# Patient Record
Sex: MALE | Race: WHITE | Employment: OTHER | URBAN - METROPOLITAN AREA
[De-identification: names, ages, dates, MRNs, and addresses within clinical notes are randomized per-mention and may not be internally consistent; named-entity substitution may affect disease eponyms.]

---

## 2021-12-13 ENCOUNTER — APPOINTMENT (OUTPATIENT)
Dept: ULTRASOUND IMAGING | Age: 56
DRG: 241 | End: 2021-12-13
Payer: MEDICAID

## 2021-12-13 ENCOUNTER — ANESTHESIA EVENT (OUTPATIENT)
Dept: OPERATING ROOM | Age: 56
DRG: 241 | End: 2021-12-13
Payer: MEDICAID

## 2021-12-13 ENCOUNTER — APPOINTMENT (OUTPATIENT)
Dept: CT IMAGING | Age: 56
DRG: 241 | End: 2021-12-13
Payer: MEDICAID

## 2021-12-13 ENCOUNTER — APPOINTMENT (OUTPATIENT)
Dept: GENERAL RADIOLOGY | Age: 56
DRG: 241 | End: 2021-12-13
Payer: MEDICAID

## 2021-12-13 ENCOUNTER — HOSPITAL ENCOUNTER (INPATIENT)
Age: 56
LOS: 4 days | Discharge: HOME | DRG: 241 | End: 2021-12-17
Attending: EMERGENCY MEDICINE | Admitting: STUDENT IN AN ORGANIZED HEALTH CARE EDUCATION/TRAINING PROGRAM
Payer: MEDICAID

## 2021-12-13 DIAGNOSIS — D50.0 ANEMIA, BLOOD LOSS: ICD-10-CM

## 2021-12-13 DIAGNOSIS — M25.531 ACUTE PAIN OF RIGHT WRIST: ICD-10-CM

## 2021-12-13 DIAGNOSIS — I48.91 ATRIAL FIBRILLATION WITH RVR (HCC): ICD-10-CM

## 2021-12-13 DIAGNOSIS — R41.82 ALTERED MENTAL STATUS, UNSPECIFIED ALTERED MENTAL STATUS TYPE: ICD-10-CM

## 2021-12-13 DIAGNOSIS — K92.1 GASTROINTESTINAL HEMORRHAGE WITH MELENA: Primary | ICD-10-CM

## 2021-12-13 PROBLEM — B35.6 TINEA CRURIS: Status: ACTIVE | Noted: 2021-12-13

## 2021-12-13 PROBLEM — E66.01 MORBID OBESITY (HCC): Status: ACTIVE | Noted: 2021-12-13

## 2021-12-13 PROBLEM — R06.02 SHORTNESS OF BREATH: Status: ACTIVE | Noted: 2021-12-13

## 2021-12-13 PROBLEM — E78.5 HYPERLIPIDEMIA: Status: ACTIVE | Noted: 2021-12-13

## 2021-12-13 PROBLEM — I10 HYPERTENSION: Status: ACTIVE | Noted: 2021-12-13

## 2021-12-13 PROBLEM — M06.9 RHEUMATOID ARTHRITIS (HCC): Status: ACTIVE | Noted: 2021-12-13

## 2021-12-13 PROBLEM — E78.5 HYPERLIPIDEMIA: Status: RESOLVED | Noted: 2021-12-13 | Resolved: 2021-12-13

## 2021-12-13 PROBLEM — K92.2 GI BLEED: Status: ACTIVE | Noted: 2021-12-13

## 2021-12-13 LAB
-: NORMAL
ABSOLUTE EOS #: 0.1 K/UL (ref 0–0.4)
ABSOLUTE IMMATURE GRANULOCYTE: ABNORMAL K/UL (ref 0–0.3)
ABSOLUTE LYMPH #: 3.7 K/UL (ref 1–4.8)
ABSOLUTE MONO #: 1.3 K/UL (ref 0.1–1.2)
ACETAMINOPHEN LEVEL: <5 UG/ML (ref 10–30)
ALBUMIN SERPL-MCNC: 3.2 G/DL (ref 3.5–5.2)
ALBUMIN/GLOBULIN RATIO: 1.1 (ref 1–2.5)
ALLEN TEST: ABNORMAL
ALP BLD-CCNC: 74 U/L (ref 40–129)
ALT SERPL-CCNC: 11 U/L (ref 5–41)
AMMONIA: 46 UMOL/L (ref 16–60)
AMPHETAMINE SCREEN URINE: NEGATIVE
ANION GAP SERPL CALCULATED.3IONS-SCNC: 16 MMOL/L (ref 9–17)
AST SERPL-CCNC: 10 U/L
BARBITURATE SCREEN URINE: NEGATIVE
BASOPHILS # BLD: 1 % (ref 0–2)
BASOPHILS ABSOLUTE: 0.3 K/UL (ref 0–0.2)
BENZODIAZEPINE SCREEN, URINE: NEGATIVE
BILIRUB SERPL-MCNC: 0.19 MG/DL (ref 0.3–1.2)
BILIRUBIN URINE: NEGATIVE
BNP INTERPRETATION: ABNORMAL
BUN BLDV-MCNC: 44 MG/DL (ref 6–20)
BUN/CREAT BLD: ABNORMAL (ref 9–20)
BUPRENORPHINE URINE: ABNORMAL
CALCIUM SERPL-MCNC: 9 MG/DL (ref 8.6–10.4)
CANNABINOID SCREEN URINE: NEGATIVE
CHLORIDE BLD-SCNC: 103 MMOL/L (ref 98–107)
CO2: 22 MMOL/L (ref 20–31)
COCAINE METABOLITE, URINE: NEGATIVE
COLOR: YELLOW
COMMENT UA: NORMAL
CREAT SERPL-MCNC: 1.19 MG/DL (ref 0.7–1.2)
D-DIMER QUANTITATIVE: 1.33 MG/L FEU
DIFFERENTIAL TYPE: ABNORMAL
DIRECT EXAM: NORMAL
EOSINOPHILS RELATIVE PERCENT: 0 % (ref 1–4)
ETHANOL PERCENT: <0.01 %
ETHANOL: <10 MG/DL
FIO2: ABNORMAL
GFR AFRICAN AMERICAN: >60 ML/MIN
GFR NON-AFRICAN AMERICAN: >60 ML/MIN
GFR SERPL CREATININE-BSD FRML MDRD: ABNORMAL ML/MIN/{1.73_M2}
GFR SERPL CREATININE-BSD FRML MDRD: ABNORMAL ML/MIN/{1.73_M2}
GLUCOSE BLD-MCNC: 162 MG/DL (ref 70–99)
GLUCOSE URINE: NEGATIVE
HCO3 VENOUS: 24.1 MMOL/L (ref 22–29)
HCT VFR BLD CALC: 19.6 % (ref 41–53)
HCT VFR BLD CALC: 24 % (ref 41–53)
HEMOGLOBIN: 6.2 G/DL (ref 13.5–17.5)
HEMOGLOBIN: 7.7 G/DL (ref 13.5–17.5)
IMMATURE GRANULOCYTES: ABNORMAL %
INR BLD: 1.3
KETONES, URINE: NEGATIVE
LACTIC ACID, SEPSIS WHOLE BLOOD: NORMAL MMOL/L (ref 0.5–1.9)
LACTIC ACID, SEPSIS: 1.8 MMOL/L (ref 0.5–1.9)
LACTIC ACID: 2.9 MMOL/L (ref 0.5–2.2)
LEUKOCYTE ESTERASE, URINE: NEGATIVE
LV EF: 48 %
LVEF MODALITY: NORMAL
LYMPHOCYTES # BLD: 17 % (ref 24–44)
Lab: NORMAL
MCH RBC QN AUTO: 25.3 PG (ref 26–34)
MCHC RBC AUTO-ENTMCNC: 32 G/DL (ref 31–37)
MCV RBC AUTO: 79.3 FL (ref 80–100)
MDMA URINE: ABNORMAL
METHADONE SCREEN, URINE: NEGATIVE
METHAMPHETAMINE, URINE: ABNORMAL
MODE: ABNORMAL
MONOCYTES # BLD: 6 % (ref 2–11)
NEGATIVE BASE EXCESS, VEN: ABNORMAL (ref 0–2)
NITRITE, URINE: NEGATIVE
NRBC AUTOMATED: ABNORMAL PER 100 WBC
O2 DEVICE/FLOW/%: ABNORMAL
O2 SAT, VEN: 58 % (ref 60–85)
OPIATES, URINE: POSITIVE
OXYCODONE SCREEN URINE: NEGATIVE
PARTIAL THROMBOPLASTIN TIME: 30.7 SEC (ref 21.3–31.3)
PATIENT TEMP: ABNORMAL
PCO2, VEN: 38.2 MM HG (ref 41–51)
PDW BLD-RTO: 17.7 % (ref 12.5–15.4)
PH UA: 5.5 (ref 5–8)
PH VENOUS: 7.41 (ref 7.32–7.43)
PHENCYCLIDINE, URINE: NEGATIVE
PLATELET # BLD: 638 K/UL (ref 140–450)
PLATELET ESTIMATE: ABNORMAL
PMV BLD AUTO: 8.4 FL (ref 6–12)
PO2, VEN: 29.9 MM HG (ref 30–50)
POC OCCULT BLOOD, FECAL: POSITIVE
POC PCO2 TEMP: ABNORMAL MM HG
POC PH TEMP: ABNORMAL
POC PO2 TEMP: ABNORMAL MM HG
POSITIVE BASE EXCESS, VEN: 0 (ref 0–3)
POTASSIUM SERPL-SCNC: 4.6 MMOL/L (ref 3.7–5.3)
PRO-BNP: 1763 PG/ML
PROPOXYPHENE, URINE: ABNORMAL
PROTEIN UA: NEGATIVE
PROTHROMBIN TIME: 13.6 SEC (ref 9.4–12.6)
RBC # BLD: 3.03 M/UL (ref 4.5–5.9)
RBC # BLD: ABNORMAL 10*6/UL
REASON FOR REJECTION: NORMAL
SALICYLATE LEVEL: <1 MG/DL (ref 3–10)
SAMPLE SITE: ABNORMAL
SARS-COV-2, RAPID: NOT DETECTED
SEG NEUTROPHILS: 76 % (ref 36–66)
SEGMENTED NEUTROPHILS ABSOLUTE COUNT: 16.2 K/UL (ref 1.8–7.7)
SODIUM BLD-SCNC: 141 MMOL/L (ref 135–144)
SPECIFIC GRAVITY UA: 1.02 (ref 1–1.03)
SPECIMEN DESCRIPTION: NORMAL
SPECIMEN DESCRIPTION: NORMAL
TEST INFORMATION: ABNORMAL
TOTAL CO2, VENOUS: ABNORMAL MMOL/L (ref 23–30)
TOTAL PROTEIN: 6.2 G/DL (ref 6.4–8.3)
TOXIC TRICYCLIC SC,BLOOD: NEGATIVE
TRICYCLIC ANTIDEPRESSANTS, UR: ABNORMAL
TROPONIN INTERP: NORMAL
TROPONIN T: NORMAL NG/ML
TROPONIN, HIGH SENSITIVITY: 11 NG/L (ref 0–22)
TSH SERPL DL<=0.05 MIU/L-ACNC: 4.84 MIU/L (ref 0.3–5)
TURBIDITY: CLEAR
URINE HGB: NEGATIVE
UROBILINOGEN, URINE: NORMAL
WBC # BLD: 21.5 K/UL (ref 3.5–11)
WBC # BLD: ABNORMAL 10*3/UL
ZZ NTE CLEAN UP: ORDERED TEST: NORMAL
ZZ NTE WITH NAME CLEAN UP: SPECIMEN SOURCE: NORMAL

## 2021-12-13 PROCEDURE — 80053 COMPREHEN METABOLIC PANEL: CPT

## 2021-12-13 PROCEDURE — 85014 HEMATOCRIT: CPT

## 2021-12-13 PROCEDURE — 96375 TX/PRO/DX INJ NEW DRUG ADDON: CPT

## 2021-12-13 PROCEDURE — 36415 COLL VENOUS BLD VENIPUNCTURE: CPT

## 2021-12-13 PROCEDURE — 83605 ASSAY OF LACTIC ACID: CPT

## 2021-12-13 PROCEDURE — 94760 N-INVAS EAR/PLS OXIMETRY 1: CPT

## 2021-12-13 PROCEDURE — 93306 TTE W/DOPPLER COMPLETE: CPT

## 2021-12-13 PROCEDURE — 85379 FIBRIN DEGRADATION QUANT: CPT

## 2021-12-13 PROCEDURE — 71260 CT THORAX DX C+: CPT

## 2021-12-13 PROCEDURE — 87635 SARS-COV-2 COVID-19 AMP PRB: CPT

## 2021-12-13 PROCEDURE — 80179 DRUG ASSAY SALICYLATE: CPT

## 2021-12-13 PROCEDURE — 6360000004 HC RX CONTRAST MEDICATION: Performed by: EMERGENCY MEDICINE

## 2021-12-13 PROCEDURE — 85610 PROTHROMBIN TIME: CPT

## 2021-12-13 PROCEDURE — 6360000004 HC RX CONTRAST MEDICATION: Performed by: INTERNAL MEDICINE

## 2021-12-13 PROCEDURE — 86901 BLOOD TYPING SEROLOGIC RH(D): CPT

## 2021-12-13 PROCEDURE — 86850 RBC ANTIBODY SCREEN: CPT

## 2021-12-13 PROCEDURE — 93005 ELECTROCARDIOGRAM TRACING: CPT | Performed by: EMERGENCY MEDICINE

## 2021-12-13 PROCEDURE — 84443 ASSAY THYROID STIM HORMONE: CPT

## 2021-12-13 PROCEDURE — 93971 EXTREMITY STUDY: CPT

## 2021-12-13 PROCEDURE — 70450 CT HEAD/BRAIN W/O DYE: CPT

## 2021-12-13 PROCEDURE — 6360000002 HC RX W HCPCS: Performed by: EMERGENCY MEDICINE

## 2021-12-13 PROCEDURE — 86920 COMPATIBILITY TEST SPIN: CPT

## 2021-12-13 PROCEDURE — G0480 DRUG TEST DEF 1-7 CLASSES: HCPCS

## 2021-12-13 PROCEDURE — 2580000003 HC RX 258: Performed by: EMERGENCY MEDICINE

## 2021-12-13 PROCEDURE — 80143 DRUG ASSAY ACETAMINOPHEN: CPT

## 2021-12-13 PROCEDURE — 80307 DRUG TEST PRSMV CHEM ANLYZR: CPT

## 2021-12-13 PROCEDURE — 82272 OCCULT BLD FECES 1-3 TESTS: CPT

## 2021-12-13 PROCEDURE — P9040 RBC LEUKOREDUCED IRRADIATED: HCPCS

## 2021-12-13 PROCEDURE — 74177 CT ABD & PELVIS W/CONTRAST: CPT

## 2021-12-13 PROCEDURE — 82140 ASSAY OF AMMONIA: CPT

## 2021-12-13 PROCEDURE — P9016 RBC LEUKOCYTES REDUCED: HCPCS

## 2021-12-13 PROCEDURE — 99285 EMERGENCY DEPT VISIT HI MDM: CPT

## 2021-12-13 PROCEDURE — 2500000003 HC RX 250 WO HCPCS: Performed by: EMERGENCY MEDICINE

## 2021-12-13 PROCEDURE — 99254 IP/OBS CNSLTJ NEW/EST MOD 60: CPT | Performed by: INTERNAL MEDICINE

## 2021-12-13 PROCEDURE — 71045 X-RAY EXAM CHEST 1 VIEW: CPT

## 2021-12-13 PROCEDURE — 82803 BLOOD GASES ANY COMBINATION: CPT

## 2021-12-13 PROCEDURE — 83880 ASSAY OF NATRIURETIC PEPTIDE: CPT

## 2021-12-13 PROCEDURE — 36430 TRANSFUSION BLD/BLD COMPNT: CPT

## 2021-12-13 PROCEDURE — 87804 INFLUENZA ASSAY W/OPTIC: CPT

## 2021-12-13 PROCEDURE — 87040 BLOOD CULTURE FOR BACTERIA: CPT

## 2021-12-13 PROCEDURE — 2580000003 HC RX 258: Performed by: STUDENT IN AN ORGANIZED HEALTH CARE EDUCATION/TRAINING PROGRAM

## 2021-12-13 PROCEDURE — C9113 INJ PANTOPRAZOLE SODIUM, VIA: HCPCS | Performed by: INTERNAL MEDICINE

## 2021-12-13 PROCEDURE — 84484 ASSAY OF TROPONIN QUANT: CPT

## 2021-12-13 PROCEDURE — 81003 URINALYSIS AUTO W/O SCOPE: CPT

## 2021-12-13 PROCEDURE — 87086 URINE CULTURE/COLONY COUNT: CPT

## 2021-12-13 PROCEDURE — 99222 1ST HOSP IP/OBS MODERATE 55: CPT | Performed by: STUDENT IN AN ORGANIZED HEALTH CARE EDUCATION/TRAINING PROGRAM

## 2021-12-13 PROCEDURE — 2060000000 HC ICU INTERMEDIATE R&B

## 2021-12-13 PROCEDURE — 85025 COMPLETE CBC W/AUTO DIFF WBC: CPT

## 2021-12-13 PROCEDURE — 85018 HEMOGLOBIN: CPT

## 2021-12-13 PROCEDURE — 6370000000 HC RX 637 (ALT 250 FOR IP): Performed by: STUDENT IN AN ORGANIZED HEALTH CARE EDUCATION/TRAINING PROGRAM

## 2021-12-13 PROCEDURE — 2500000003 HC RX 250 WO HCPCS: Performed by: STUDENT IN AN ORGANIZED HEALTH CARE EDUCATION/TRAINING PROGRAM

## 2021-12-13 PROCEDURE — 85730 THROMBOPLASTIN TIME PARTIAL: CPT

## 2021-12-13 PROCEDURE — 2580000003 HC RX 258: Performed by: INTERNAL MEDICINE

## 2021-12-13 PROCEDURE — 73110 X-RAY EXAM OF WRIST: CPT

## 2021-12-13 PROCEDURE — 6360000002 HC RX W HCPCS: Performed by: INTERNAL MEDICINE

## 2021-12-13 PROCEDURE — 96374 THER/PROPH/DIAG INJ IV PUSH: CPT

## 2021-12-13 PROCEDURE — C9113 INJ PANTOPRAZOLE SODIUM, VIA: HCPCS | Performed by: EMERGENCY MEDICINE

## 2021-12-13 PROCEDURE — 86900 BLOOD TYPING SEROLOGIC ABO: CPT

## 2021-12-13 RX ORDER — PANTOPRAZOLE SODIUM 40 MG/10ML
40 INJECTION, POWDER, LYOPHILIZED, FOR SOLUTION INTRAVENOUS ONCE
Status: COMPLETED | OUTPATIENT
Start: 2021-12-13 | End: 2021-12-13

## 2021-12-13 RX ORDER — MORPHINE SULFATE 4 MG/ML
4 INJECTION, SOLUTION INTRAMUSCULAR; INTRAVENOUS ONCE
Status: DISCONTINUED | OUTPATIENT
Start: 2021-12-13 | End: 2021-12-13

## 2021-12-13 RX ORDER — SODIUM CHLORIDE 0.9 % (FLUSH) 0.9 %
5-40 SYRINGE (ML) INJECTION EVERY 12 HOURS SCHEDULED
Status: DISCONTINUED | OUTPATIENT
Start: 2021-12-13 | End: 2021-12-17 | Stop reason: HOSPADM

## 2021-12-13 RX ORDER — 0.9 % SODIUM CHLORIDE 0.9 %
80 INTRAVENOUS SOLUTION INTRAVENOUS ONCE
Status: COMPLETED | OUTPATIENT
Start: 2021-12-13 | End: 2021-12-13

## 2021-12-13 RX ORDER — ACETAMINOPHEN 325 MG/1
650 TABLET ORAL EVERY 6 HOURS PRN
Status: DISCONTINUED | OUTPATIENT
Start: 2021-12-13 | End: 2021-12-17 | Stop reason: HOSPADM

## 2021-12-13 RX ORDER — SODIUM CHLORIDE 0.9 % (FLUSH) 0.9 %
10 SYRINGE (ML) INJECTION PRN
Status: DISCONTINUED | OUTPATIENT
Start: 2021-12-13 | End: 2021-12-17 | Stop reason: HOSPADM

## 2021-12-13 RX ORDER — KETOCONAZOLE 20 MG/G
CREAM TOPICAL DAILY
Status: DISCONTINUED | OUTPATIENT
Start: 2021-12-14 | End: 2021-12-17 | Stop reason: HOSPADM

## 2021-12-13 RX ORDER — SODIUM CHLORIDE 0.9 % (FLUSH) 0.9 %
5-40 SYRINGE (ML) INJECTION PRN
Status: DISCONTINUED | OUTPATIENT
Start: 2021-12-13 | End: 2021-12-17 | Stop reason: HOSPADM

## 2021-12-13 RX ORDER — ACETAMINOPHEN 650 MG/1
650 SUPPOSITORY RECTAL EVERY 6 HOURS PRN
Status: DISCONTINUED | OUTPATIENT
Start: 2021-12-13 | End: 2021-12-17 | Stop reason: HOSPADM

## 2021-12-13 RX ORDER — SODIUM CHLORIDE 9 MG/ML
10 INJECTION INTRAVENOUS ONCE
Status: COMPLETED | OUTPATIENT
Start: 2021-12-13 | End: 2021-12-13

## 2021-12-13 RX ORDER — MORPHINE SULFATE 4 MG/ML
4 INJECTION, SOLUTION INTRAMUSCULAR; INTRAVENOUS ONCE
Status: COMPLETED | OUTPATIENT
Start: 2021-12-13 | End: 2021-12-13

## 2021-12-13 RX ORDER — ONDANSETRON 4 MG/1
4 TABLET, ORALLY DISINTEGRATING ORAL EVERY 8 HOURS PRN
Status: DISCONTINUED | OUTPATIENT
Start: 2021-12-13 | End: 2021-12-17 | Stop reason: HOSPADM

## 2021-12-13 RX ORDER — DILTIAZEM HYDROCHLORIDE 5 MG/ML
20 INJECTION INTRAVENOUS ONCE
Status: DISCONTINUED | OUTPATIENT
Start: 2021-12-13 | End: 2021-12-13

## 2021-12-13 RX ORDER — SODIUM CHLORIDE 9 MG/ML
INJECTION, SOLUTION INTRAVENOUS PRN
Status: DISCONTINUED | OUTPATIENT
Start: 2021-12-13 | End: 2021-12-17 | Stop reason: HOSPADM

## 2021-12-13 RX ORDER — METOPROLOL SUCCINATE 50 MG/1
50 TABLET, EXTENDED RELEASE ORAL 2 TIMES DAILY
Status: ON HOLD | COMMUNITY
End: 2021-12-16 | Stop reason: HOSPADM

## 2021-12-13 RX ORDER — MELOXICAM 7.5 MG/1
7.5 TABLET ORAL PRN
Status: ON HOLD | COMMUNITY
End: 2021-12-16 | Stop reason: HOSPADM

## 2021-12-13 RX ORDER — GABAPENTIN 100 MG/1
100 CAPSULE ORAL 2 TIMES DAILY
Status: ON HOLD | COMMUNITY
End: 2021-12-16 | Stop reason: HOSPADM

## 2021-12-13 RX ORDER — IBUPROFEN 200 MG
200 TABLET ORAL EVERY 6 HOURS PRN
Status: ON HOLD | COMMUNITY
End: 2021-12-16 | Stop reason: HOSPADM

## 2021-12-13 RX ORDER — 0.9 % SODIUM CHLORIDE 0.9 %
1000 INTRAVENOUS SOLUTION INTRAVENOUS ONCE
Status: COMPLETED | OUTPATIENT
Start: 2021-12-13 | End: 2021-12-13

## 2021-12-13 RX ORDER — SODIUM CHLORIDE 9 MG/ML
25 INJECTION, SOLUTION INTRAVENOUS PRN
Status: DISCONTINUED | OUTPATIENT
Start: 2021-12-13 | End: 2021-12-17 | Stop reason: HOSPADM

## 2021-12-13 RX ORDER — AMLODIPINE BESYLATE 5 MG/1
5 TABLET ORAL DAILY
Status: ON HOLD | COMMUNITY
End: 2021-12-16 | Stop reason: HOSPADM

## 2021-12-13 RX ORDER — ONDANSETRON 2 MG/ML
4 INJECTION INTRAMUSCULAR; INTRAVENOUS EVERY 6 HOURS PRN
Status: DISCONTINUED | OUTPATIENT
Start: 2021-12-13 | End: 2021-12-17 | Stop reason: HOSPADM

## 2021-12-13 RX ADMIN — ACETAMINOPHEN 650 MG: 325 TABLET ORAL at 20:10

## 2021-12-13 RX ADMIN — SODIUM CHLORIDE 8 MG/HR: 9 INJECTION, SOLUTION INTRAVENOUS at 20:25

## 2021-12-13 RX ADMIN — SODIUM CHLORIDE 1000 ML: 9 INJECTION, SOLUTION INTRAVENOUS at 07:28

## 2021-12-13 RX ADMIN — SODIUM CHLORIDE 10 ML: 9 INJECTION, SOLUTION INTRAMUSCULAR; INTRAVENOUS; SUBCUTANEOUS at 08:27

## 2021-12-13 RX ADMIN — DILTIAZEM HYDROCHLORIDE 5 MG/HR: 5 INJECTION INTRAVENOUS at 09:05

## 2021-12-13 RX ADMIN — SODIUM CHLORIDE 80 ML: 9 INJECTION, SOLUTION INTRAVENOUS at 08:42

## 2021-12-13 RX ADMIN — SODIUM CHLORIDE 80 ML: 9 INJECTION, SOLUTION INTRAVENOUS at 14:55

## 2021-12-13 RX ADMIN — SODIUM CHLORIDE, PRESERVATIVE FREE 10 ML: 5 INJECTION INTRAVENOUS at 14:55

## 2021-12-13 RX ADMIN — SODIUM CHLORIDE, PRESERVATIVE FREE 10 ML: 5 INJECTION INTRAVENOUS at 20:10

## 2021-12-13 RX ADMIN — IOPAMIDOL 75 ML: 755 INJECTION, SOLUTION INTRAVENOUS at 08:42

## 2021-12-13 RX ADMIN — DILTIAZEM HYDROCHLORIDE 5 MG/HR: 5 INJECTION INTRAVENOUS at 23:06

## 2021-12-13 RX ADMIN — PANTOPRAZOLE SODIUM 40 MG: 40 INJECTION, POWDER, FOR SOLUTION INTRAVENOUS at 08:27

## 2021-12-13 RX ADMIN — DILTIAZEM HYDROCHLORIDE 5 MG/HR: 5 INJECTION INTRAVENOUS at 16:59

## 2021-12-13 RX ADMIN — SODIUM CHLORIDE, PRESERVATIVE FREE 10 ML: 5 INJECTION INTRAVENOUS at 08:43

## 2021-12-13 RX ADMIN — IOPAMIDOL 75 ML: 755 INJECTION, SOLUTION INTRAVENOUS at 14:59

## 2021-12-13 RX ADMIN — MORPHINE SULFATE 4 MG: 4 INJECTION INTRAVENOUS at 08:24

## 2021-12-13 ASSESSMENT — PAIN DESCRIPTION - ORIENTATION
ORIENTATION: RIGHT

## 2021-12-13 ASSESSMENT — PAIN DESCRIPTION - PROGRESSION
CLINICAL_PROGRESSION: GRADUALLY IMPROVING

## 2021-12-13 ASSESSMENT — PAIN DESCRIPTION - PAIN TYPE
TYPE: ACUTE PAIN

## 2021-12-13 ASSESSMENT — PAIN SCALES - GENERAL
PAINLEVEL_OUTOF10: 5
PAINLEVEL_OUTOF10: 10
PAINLEVEL_OUTOF10: 7
PAINLEVEL_OUTOF10: 3
PAINLEVEL_OUTOF10: 0
PAINLEVEL_OUTOF10: 10

## 2021-12-13 ASSESSMENT — PAIN DESCRIPTION - LOCATION
LOCATION: WRIST;ABDOMEN
LOCATION: WRIST

## 2021-12-13 ASSESSMENT — PAIN DESCRIPTION - DESCRIPTORS
DESCRIPTORS: STABBING
DESCRIPTORS: ACHING
DESCRIPTORS: STABBING;THROBBING

## 2021-12-13 NOTE — H&P
patient states that his symptoms began several days ago and have progressively worsened. He is a  who receives most of his medical care in [place]. In the ED, the patient was mildly hypotensive and ill-appearing. He was found to have an acute blood loss anemia with a hemoglobin of 7.7. Fecal occult blood was positive. He is admitted to internal medicine for further management of acute blood loss anemia secondary to GI bleed. Past Medical History:     Past Medical History:   Diagnosis Date    Arthritis     Juvenile rheumatoid arthritis    Atrial fibrillation ([address]     Hyperlipidemia 12/13/2021    Hypertension         Past Surgical History:     Past Surgical History:   Procedure Laterality Date    ACHILLES TENDON SURGERY      KNEE SURGERY          Medications Prior to Admission:     Prior to Admission medications    Medication Sig Start Date End Date Taking? Authorizing Provider   amLODIPine (NORVASC) 5 MG tablet Take 5 mg by mouth daily   Yes Historical Provider, MD   Loperamide-Simethicone (IMODIUM ADVANCED PO) Take by mouth   Yes Historical Provider, MD   gabapentin (NEURONTIN) 100 MG capsule Take 100 mg by mouth 2 times daily. Yes Historical Provider, MD   metoprolol succinate (TOPROL XL) 50 MG extended release tablet Take 50 mg by mouth 2 times daily   Yes Historical Provider, MD   meloxicam (MOBIC) 7.5 MG tablet Take 7.5 mg by mouth as needed for Pain   Yes Historical Provider, MD   methotrexate (RHEUMATREX) 2.5 MG chemo tablet Take 7.5 mg by mouth once a week   Yes Historical Provider, MD   rivaroxaban (XARELTO) 20 MG TABS tablet Take 20 mg by mouth Daily with supper   Yes Historical Provider, MD   ibuprofen (ADVIL;MOTRIN) 200 MG tablet Take 200 mg by mouth every 6 hours as needed for Pain   Yes Historical Provider, MD        Allergies:     Patient has no known allergies. Social History:     Tobacco:    reports that he has never smoked.  He has never used smokeless tobacco.  Alcohol: reports current alcohol use. Drug Use:  reports no history of drug use. Family History:     History reviewed. No pertinent family history. Review of Systems:     Positive and Negative as described in HPI. CONSTITUTIONAL:  Positive for fatigue. HEENT:  negative for vision, hearing changes, runny nose, throat pain  RESPIRATORY:  negative for shortness of breath, cough, congestion, wheezing  CARDIOVASCULAR:  negative for chest pain, palpitations  GASTROINTESTINAL:  Positive for dark, tarry stools. GENITOURINARY:  negative for difficulty of urination, burning with urination, frequency   INTEGUMENT:  Positive for rash in groin. HEMATOLOGIC/LYMPHATIC:  negative for swelling/edema   ALLERGIC/IMMUNOLOGIC:  negative for urticaria , itching  ENDOCRINE:  negative increase in drinking, increase in urination, hot or cold intolerance  MUSCULOSKELETAL:  negative joint pains, muscle aches, swelling of joints  NEUROLOGICAL:  negative for headaches, dizziness, lightheadedness, numbness, pain, tingling extremities  BEHAVIOR/PSYCH:  negative for depression, anxiety    Physical Exam:   BP 94/69   Pulse 130   Temp 97.7 °F (36.5 °C) (Oral)   Resp 22   Ht 6' 5\" (1.956 m)   Wt (!) 330 lb (149.7 kg)   SpO2 99%   BMI 39.13 kg/m²   Temp (24hrs), Av.7 °F (36.5 °C), Min:97.7 °F (36.5 °C), Max:97.7 °F (36.5 °C)    No results for input(s): POCGLU in the last 72 hours. Intake/Output Summary (Last 24 hours) at 2021 1113  Last data filed at 2021 0942  Gross per 24 hour   Intake 1080 ml   Output --   Net 1080 ml       General Appearance:  Ill-appearing.    Mental status: oriented to person, place, and time  Head:  normocephalic, atraumatic  Eye: no icterus, redness, pupils equal and reactive, extraocular eye movements intact, conjunctiva clear  Ear: normal external ear, no discharge, hearing intact  Nose:  no drainage noted  Mouth: mucous membranes moist  Neck: supple, no carotid bruits, thyroid not palpable  Lungs: Decreased breath sounds bilaterally.    Cardiovascular: normal rate, regular rhythm, no murmur, gallop, rub  Abdomen: Soft, nontender, nondistended, normal bowel sounds, no hepatomegaly or splenomegaly  Neurologic: There are no new focal motor or sensory deficits, normal muscle tone and bulk, no abnormal sensation, normal speech, cranial nerves II through XII grossly intact  Skin: No gross lesions, rashes, bruising or bleeding on exposed skin area  Extremities:  Chronic venous stasis wounds present on bilateral lower extremities   Psych: normal affect     Investigations:      Laboratory Testing:  Recent Results (from the past 24 hour(s))   CBC Auto Differential    Collection Time: 12/13/21  5:55 AM   Result Value Ref Range    WBC 21.5 (H) 3.5 - 11.0 k/uL    RBC 3.03 (L) 4.5 - 5.9 m/uL    Hemoglobin 7.7 (L) 13.5 - 17.5 g/dL    Hematocrit 24.0 (L) 41 - 53 %    MCV 79.3 (L) 80 - 100 fL    MCH 25.3 (L) 26 - 34 pg    MCHC 32.0 31 - 37 g/dL    RDW 17.7 (H) 12.5 - 15.4 %    Platelets 391 (H) 176 - 450 k/uL    MPV 8.4 6.0 - 12.0 fL    NRBC Automated NOT REPORTED per 100 WBC    Differential Type NOT REPORTED     Immature Granulocytes NOT REPORTED 0 %    Absolute Immature Granulocyte NOT REPORTED 0.00 - 0.30 k/uL    WBC Morphology NOT REPORTED     RBC Morphology NOT REPORTED     Platelet Estimate NOT REPORTED     Seg Neutrophils 76 (H) 36 - 66 %    Lymphocytes 17 (L) 24 - 44 %    Monocytes 6 2 - 11 %    Eosinophils % 0 (L) 1 - 4 %    Basophils 1 0 - 2 %    Segs Absolute 16.20 (H) 1.8 - 7.7 k/uL    Absolute Lymph # 3.70 1.0 - 4.8 k/uL    Absolute Mono # 1.30 (H) 0.1 - 1.2 k/uL    Absolute Eos # 0.10 0.0 - 0.4 k/uL    Basophils Absolute 0.30 (H) 0.0 - 0.2 k/uL   Comprehensive Metabolic Panel w/ Reflex to MG    Collection Time: 12/13/21  5:55 AM   Result Value Ref Range    Glucose 162 (H) 70 - 99 mg/dL    BUN 44 (H) 6 - 20 mg/dL    CREATININE 1.19 0.70 - 1.20 mg/dL    Bun/Cre Ratio NOT REPORTED 9 - 20    Calcium 9.0 8.6 - 10.4 mg/dL    Sodium 141 135 - 144 mmol/L    Potassium 4.6 3.7 - 5.3 mmol/L    Chloride 103 98 - 107 mmol/L    CO2 22 20 - 31 mmol/L    Anion Gap 16 9 - 17 mmol/L    Alkaline Phosphatase 74 40 - 129 U/L    ALT 11 5 - 41 U/L    AST 10 <40 U/L    Total Bilirubin 0.19 (L) 0.3 - 1.2 mg/dL    Total Protein 6.2 (L) 6.4 - 8.3 g/dL    Albumin 3.2 (L) 3.5 - 5.2 g/dL    Albumin/Globulin Ratio 1.1 1.0 - 2.5    GFR Non-African American >60 >60 mL/min    GFR African American >60 >60 mL/min    GFR Comment          GFR Staging NOT REPORTED    Protime-INR    Collection Time: 12/13/21  5:55 AM   Result Value Ref Range    Protime 13.6 (H) 9.4 - 12.6 sec    INR 1.3    APTT    Collection Time: 12/13/21  5:55 AM   Result Value Ref Range    PTT 30.7 21.3 - 31.3 sec   Brain Natriuretic Peptide    Collection Time: 12/13/21  5:55 AM   Result Value Ref Range    Pro-BNP 1,763 (H) <300 pg/mL    BNP Interpretation NOT REPORTED    D-Dimer, Quantitative    Collection Time: 12/13/21  5:55 AM   Result Value Ref Range    D-Dimer, Quant 1.33 mg/L FEU   TOX SCR, BLD, ED    Collection Time: 12/13/21  5:55 AM   Result Value Ref Range    Acetaminophen Level <5 (L) 10 - 30 ug/mL    Ethanol <10 <10 mg/dL    Ethanol percent <0.433 <0.420 %    Salicylate Lvl <1 (L) 3 - 10 mg/dL    Toxic Tricyclic Sc,Blood NEGATIVE NEGATIVE   Lactic Acid    Collection Time: 12/13/21  5:55 AM   Result Value Ref Range    Lactic Acid 2.9 (H) 0.5 - 2.2 mmol/L   Troponin    Collection Time: 12/13/21  5:55 AM   Result Value Ref Range    Troponin, High Sensitivity 11 0 - 22 ng/L    Troponin T NOT REPORTED <0.03 ng/mL    Troponin Interp NOT REPORTED    TSH with Reflex    Collection Time: 12/13/21  5:55 AM   Result Value Ref Range    TSH 4.84 0.30 - 5.00 mIU/L   SPECIMEN REJECTION    Collection Time: 12/13/21  5:55 AM   Result Value Ref Range    Specimen Source . BLOOD     Ordered Test AMMONIA     Reason for Rejection Unable to perform testing: No specimen received.      - NOT REPORTED    Rapid Influenza A/B Antigens    Collection Time: 12/13/21  6:48 AM    Specimen: Nasopharyngeal Swab   Result Value Ref Range    Specimen Description . NASOPHARYNGEAL SWAB     Special Requests NOT REPORTED     Direct Exam       NEGATIVE for Influenza A + B antigens. PCR testing to confirm this result is available upon request.  Specimen will be saved in the laboratory for 7 days. Please call [phone] if PCR testing is indicated. Culture, Blood 1    Collection Time: 12/13/21  6:50 AM    Specimen: Blood   Result Value Ref Range    Specimen Description . BLOOD     Special Requests 20ml iv draw     Culture NO GROWTH <24 HRS    TYPE AND SCREEN    Collection Time: 12/13/21  6:53 AM   Result Value Ref Range    Expiration Date 12/16/2021,2359     Arm Band Number [id number]     ABO/Rh A POSITIVE     Antibody Screen NEGATIVE     Unit Number [id number]     Product Code Leukocyte Reduced Irradiated Red Cell     Unit Divison 00     Dispense Status ALLOCATED     Transfusion Status OK TO TRANSFUSE     Crossmatch Result COMPATIBLE    Culture, Blood 2    Collection Time: 12/13/21  7:00 AM    Specimen: Blood   Result Value Ref Range    Specimen Description . BLOOD     Special Requests 10ml right arm     Culture NO GROWTH <24 HRS    COVID-19, Rapid    Collection Time: 12/13/21  7:00 AM    Specimen: Nasopharyngeal Swab   Result Value Ref Range    Specimen Description . NASOPHARYNGEAL SWAB     SARS-CoV-2, Rapid Not Detected Not Detected   Venous Blood Gas, POC    Collection Time: 12/13/21  7:22 AM   Result Value Ref Range    pH, Madhav 7.408 7.320 - 7.430    pCO2, Madhav 38.2 (L) 41.0 - 51.0 mm Hg    pO2, Madhav 29.9 (L) 30.0 - 50.0 mm Hg    HCO3, Venous 24.1 22.0 - 29.0 mmol/L    Total CO2, Venous NOT REPORTED 23.0 - 30.0 mmol/L    Negative Base Excess, Madhav NOT REPORTED 0.0 - 2.0    Positive Base Excess, Madhav 0 0.0 - 3.0    O2 Sat, Madhav 58 (L) 60.0 - 85.0 %    O2 Device/Flow/% NOT REPORTED     [place] Riri Test NOT REPORTED     Sample Site NOT REPORTED     Mode NOT REPORTED     FIO2 NOT REPORTED     Pt Temp NOT REPORTED     POC pH Temp NOT REPORTED     POC pCO2 Temp NOT REPORTED mm Hg    POC pO2 Temp NOT REPORTED mm Hg   POC Fecal Occult Blood    Collection Time: 12/13/21  7:24 AM   Result Value Ref Range    POC Occult Blood, Fecal POSITIVE (A) NEGATIVE   Lactate, Sepsis    Collection Time: 12/13/21  8:22 AM   Result Value Ref Range    Lactic Acid, Sepsis 1.8 0.5 - 1.9 mmol/L    Lactic Acid, Sepsis, Whole Blood NOT REPORTED 0.5 - 1.9 mmol/L   Ammonia    Collection Time: 12/13/21  8:22 AM   Result Value Ref Range    Ammonia 46 16 - 60 umol/L   Urinalysis    Collection Time: 12/13/21  9:10 AM   Result Value Ref Range    Color, UA Yellow Yellow    Turbidity UA Clear Clear    Glucose, Ur NEGATIVE NEGATIVE    Bilirubin Urine NEGATIVE NEGATIVE    Ketones, Urine NEGATIVE NEGATIVE    Specific Gravity, UA 1.020 1.005 - 1.030    Urine Hgb NEGATIVE NEGATIVE    pH, UA 5.5 5.0 - 8.0    Protein, UA NEGATIVE NEGATIVE    Urobilinogen, Urine Normal Normal    Nitrite, Urine NEGATIVE NEGATIVE    Leukocyte Esterase, Urine NEGATIVE NEGATIVE    Urinalysis Comments       Microscopic exam not performed based on chemical results unless requested in original order.    Urine Drug Screen    Collection Time: 12/13/21  9:10 AM   Result Value Ref Range    Amphetamine Screen, Ur NEGATIVE NEGATIVE    Barbiturate Screen, Ur NEGATIVE NEGATIVE    Benzodiazepine Screen, Urine NEGATIVE NEGATIVE    Cocaine Metabolite, Urine NEGATIVE NEGATIVE    Methadone Screen, Urine NEGATIVE NEGATIVE    Opiates, Urine POSITIVE (A) NEGATIVE    Phencyclidine, Urine NEGATIVE NEGATIVE    Propoxyphene, Urine NOT REPORTED NEGATIVE    Cannabinoid Scrn, Ur NEGATIVE NEGATIVE    Oxycodone Screen, Ur NEGATIVE NEGATIVE    Methamphetamine, Urine NOT REPORTED NEGATIVE    Tricyclic Antidepressants, Urine NOT REPORTED NEGATIVE    MDMA, Urine NOT REPORTED NEGATIVE    Buprenorphine Urine NOT REPORTED NEGATIVE    Test Information       Assay provides medical screening only. The absence of expected drug(s) and/or metabolite(s) may indicate diluted or adulterated urine, limitations of testing or timing of collection. Imaging/Diagnostics:  XR WRIST RIGHT (MIN 3 VIEWS)    Result Date: 12/13/2021  Possible nondisplaced fracture(s) involving the proximal phalanx of the 3rd digit as described. Chronic findings as described. Recommend dedicated hand radiograph for further assessment. CT HEAD WO CONTRAST    Result Date: 12/13/2021  No acute intracranial abnormality. CT ABDOMEN PELVIS W IV CONTRAST Additional Contrast? None    Result Date: 12/13/2021  1. No acute inflammatory process identified. 2.  Diverticulosis. RECOMMENDATIONS: Unavailable     XR CHEST PORTABLE    Result Date: 12/13/2021  Prior study available. Borderline cardiomegaly with venous hypertension but no obvious radiographic CHF. Hazy opacities mid-lower zones, probably combination superimposed soft tissue and some atelectasis; subtle ground-glass opacity difficult to exclude.        Assessment :      Hospital Problems           Last Modified POA    GI bleed 12/13/2021 Yes    Hypertension 12/13/2021 Yes    Rheumatoid arthritis ([address] 12/13/2021 Yes    Shortness of breath 12/13/2021 Yes    Atrial fibrillation ([address] 12/13/2021 Yes    Morbid obesity ([address] 12/13/2021 Yes    Tinea cruris 12/13/2021 Yes          Plan:     Patient status inpatient in the Progressive Unit/Step down    Acute blood loss anemia   -Secondary to GI bleed   -Fecal occult blood positive   -Pantoprazole infusion   -Hemoglobin q6h   -Discussed with gastroenterology; plan for scope tomorrow   -Iron and TIBC pending   -Maintenance fluids   -Diet clear liquid; NPO after midnight   -Holding home Xarelto   -Daily CBC    Atrial fibrillation with RVR  -Likely secondary to anemia   -Continue diltiazem infusion   -Cannot anticoagulate due to GI bleed   -Telemetry monitoring   -Daily CMP     Shortness of breath   -Possibly due to symptomatic anemia   -D-dimer elevated at 1.33   -Will obtain pulmonary angiogram to rule-out PE   -Supplemental O2 as needed     Hypertension   -Holding home metoprolol and amlodipine due to hypotension     Rheumatoid arthritis   -Holding home meloxicam and prednisone secondary to GI bleed   -Follow-up with rheumatology outpatient as scheduled     Morbid obesity 2/2 excessive caloric intake   -Will  on dietary modifications when appropriate     Tinea cruris   -BID ketoconazole     Consultations:   IP CONSULT TO GI    Patient is admitted as inpatient status because of co-morbidities listed above, severity of signs and symptoms as outlined, requirement for current medical therapies and most importantly because of direct risk to patient if care not provided in a hospital setting. Expected length of stay > 48 hours. Marcellus Veronica MD  12/13/2021  11:13 AM    Copy sent to Dr. Geovanna Amin primary care provider on file.
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
## 2021-12-13 NOTE — ED NOTES
Pt wife, Samia Win, called with pt medical history and surgical history. Wife phone number  Is [phone] and says if we are in need of any further information we can call her.      Yevgeniy Henderson RN  12/13/21 9433

## 2021-12-13 NOTE — ED PROVIDER NOTES
previous injury or surgery to the right wrist.  He has not taken any medications for pain other than his prescribed Mobic which has not helped much with the pain. The patient reports that starting 2 days ago he began having intermittent dark black stools and just prior to arrival he had a large amount of black stool. He states that he is on Xarelto for atrial fibrillation. He denies any previous history of GI bleed, abdominal surgery, or colonoscopy. The patient states that he developed nausea today but has not vomited. He denies striking his head or loss of consciousness. Patient currently denies any abdominal pain. He denies fever, chills, headache, vision changes, neck pain, back pain, chest pain, urinary symptoms, focal weakness, numbness, tingling, or recent illness. REVIEW OF SYSTEMS     Ten point review of systems was reviewed and is negative unless otherwise noted in the HPI    [address]    has a past medical history of Arthritis, Atrial fibrillation ([facility] [address] and Hypertension. SURGICAL HISTORY      has a past surgical history that includes knee surgery and Achilles tendon surgery. CURRENT MEDICATIONS       Previous Medications    AMLODIPINE (NORVASC) 5 MG TABLET    Take 5 mg by mouth daily    GABAPENTIN (NEURONTIN) 100 MG CAPSULE    Take 100 mg by mouth 2 times daily. IBUPROFEN (ADVIL;MOTRIN) 200 MG TABLET    Take 200 mg by mouth every 6 hours as needed for Pain    LOPERAMIDE-SIMETHICONE (IMODIUM ADVANCED PO)    Take by mouth    MELOXICAM (MOBIC) 7.5 MG TABLET    Take 7.5 mg by mouth as needed for Pain    METHOTREXATE (RHEUMATREX) 2.5 MG CHEMO TABLET    Take 7.5 mg by mouth once a week    METOPROLOL SUCCINATE (TOPROL XL) 50 MG EXTENDED RELEASE TABLET    Take 50 mg by mouth 2 times daily    RIVAROXABAN (XARELTO) 20 MG TABS TABLET    Take 20 mg by mouth Daily with supper       ALLERGIES     has No Known Allergies. FAMILY HISTORY     has no family status information on file. family history is not on file. SOCIAL HISTORY      reports that he has never smoked. He has never used smokeless tobacco. He reports current alcohol use. He reports that he does not use drugs. PHYSICAL EXAM     INITIAL VITALS:  height is 6' 5\" (1.956 m) and weight is 149.7 kg (330 lb) (abnormal). His oral temperature is 97.7 °F (36.5 °C). His blood pressure is 98/77 and his pulse is 131. His respiration is 18 and oxygen saturation is 98%. CONSTITUTIONAL: Ill-appearing, pale, unable to find a comfortable position, confused, morbidly obese  SKIN: Pale, skin warm, dry, no jaundice, hives or petechiae  EYES: clear conjunctiva, non-icteric sclera, pale palpebral conjunctiva  HENT: normocephalic, atraumatic, moist mucus membranes. No hemotympanum, woods sign, raccoon eyes or septal hematoma. NECK: Nontender and supple with no nuchal rigidity, full range of motion  PULMONARY: Diminished at the bases without wheezes, rhonchi, or rales, normal excursion, no accessory muscle use and no stridor  CARDIOVASCULAR: Tachycardic rate, irregularly irregular rhythm. Strong radial pulses with intact distal perfusion. Capillary refill <2 seconds. GASTROINTESTINAL: soft, non-tender, morbidly obese, no pain or McBurney's point, negative Luis sign, no ecchymosis, no pulsatile mass, non-distended, no palpable masses, no rebound or guarding   RECTAL: Performed with RN chaperone, black stool noted around the rectum, Hemoccult positive, normal rectal tone and sensation, no hemorrhoids visible  GENITOURINARY: No costovertebral angle tenderness to palpation  MUSCULOSKELETAL: Tenderness to palpation, abrasions, and edema noted to the right wrist.  No pain over the right hand, right elbow or right shoulder. Radial, ulnar and median nerves intact to the bilateral upper extremities. Able to perform intrinsic movements of the hand without difficulty. Decreased  strength on the right secondary to pain.   No snuff box tenderness. Radial pulses 2+/4. Capillary refill less than 2 seconds. No midline spinal tenderness, step off or deformity. Extremities are otherwise nontender to palpation and nonerythematous. Compartments soft. No peripheral edema. NEUROLOGIC: alert and oriented x 3, GCS 15, has difficulty answering questions, normal speech.  Moves all extremities x 4 without motor or sensory deficit, gait is stable without ataxia  PSYCHIATRIC: Confused    DIAGNOSTIC RESULTS     EKG:  EKG 5:56 AM atrial fibrillation with RVR, rate 132 bpm, normal axis, normal QRS and QTc intervals, no ST elevation or depression, no T wave inversions, poor R wave progression, Q wave in aVR, no previous EKG for comparison    RADIOLOGY:   Pending    LABS:  Results for orders placed or performed during the hospital encounter of 12/13/21   CBC Auto Differential   Result Value Ref Range    WBC 21.5 (H) 3.5 - 11.0 k/uL    RBC 3.03 (L) 4.5 - 5.9 m/uL    Hemoglobin 7.7 (L) 13.5 - 17.5 g/dL    Hematocrit 24.0 (L) 41 - 53 %    MCV 79.3 (L) 80 - 100 fL    MCH 25.3 (L) 26 - 34 pg    MCHC 32.0 31 - 37 g/dL    RDW 17.7 (H) 12.5 - 15.4 %    Platelets 618 (H) 339 - 450 k/uL    MPV 8.4 6.0 - 12.0 fL    NRBC Automated NOT REPORTED per 100 WBC    Differential Type NOT REPORTED     Immature Granulocytes NOT REPORTED 0 %    Absolute Immature Granulocyte NOT REPORTED 0.00 - 0.30 k/uL    WBC Morphology NOT REPORTED     RBC Morphology NOT REPORTED     Platelet Estimate NOT REPORTED     Seg Neutrophils 76 (H) 36 - 66 %    Lymphocytes 17 (L) 24 - 44 %    Monocytes 6 2 - 11 %    Eosinophils % 0 (L) 1 - 4 %    Basophils 1 0 - 2 %    Segs Absolute 16.20 (H) 1.8 - 7.7 k/uL    Absolute Lymph # 3.70 1.0 - 4.8 k/uL    Absolute Mono # 1.30 (H) 0.1 - 1.2 k/uL    Absolute Eos # 0.10 0.0 - 0.4 k/uL    Basophils Absolute 0.30 (H) 0.0 - 0.2 k/uL   Comprehensive Metabolic Panel w/ Reflex to MG   Result Value Ref Range    Glucose 162 (H) 70 - 99 mg/dL    BUN 44 (H) 6 - 20 mg/dL CREATININE 1.19 0.70 - 1.20 mg/dL    Bun/Cre Ratio NOT REPORTED 9 - 20    Calcium 9.0 8.6 - 10.4 mg/dL    Sodium 141 135 - 144 mmol/L    Potassium 4.6 3.7 - 5.3 mmol/L    Chloride 103 98 - 107 mmol/L    CO2 22 20 - 31 mmol/L    Anion Gap 16 9 - 17 mmol/L    Alkaline Phosphatase 74 40 - 129 U/L    ALT 11 5 - 41 U/L    AST 10 <40 U/L    Total Bilirubin 0.19 (L) 0.3 - 1.2 mg/dL    Total Protein 6.2 (L) 6.4 - 8.3 g/dL    Albumin 3.2 (L) 3.5 - 5.2 g/dL    Albumin/Globulin Ratio 1.1 1.0 - 2.5    GFR Non-African American >60 >60 mL/min    GFR African American >60 >60 mL/min    GFR Comment          GFR Staging NOT REPORTED    Protime-INR   Result Value Ref Range    Protime 13.6 (H) 9.4 - 12.6 sec    INR 1.3    APTT   Result Value Ref Range    PTT 30.7 21.3 - 31.3 sec   TOX SCR, BLD, ED   Result Value Ref Range    Acetaminophen Level <5 (L) 10 - 30 ug/mL    Ethanol <10 <10 mg/dL    Ethanol percent <3.998 <1.849 %    Salicylate Lvl <1 (L) 3 - 10 mg/dL    Toxic Tricyclic Sc,Blood PENDING NEGATIVE   Lactic Acid   Result Value Ref Range    Lactic Acid 2.9 (H) 0.5 - 2.2 mmol/L   Troponin   Result Value Ref Range    Troponin, High Sensitivity PENDING 0 - 22 ng/L    Troponin T NOT REPORTED <0.03 ng/mL    Troponin Interp NOT REPORTED    Venous Blood Gas, POC   Result Value Ref Range    pH, Madhav 7.408 7.320 - 7.430    pCO2, Madhav 38.2 (L) 41.0 - 51.0 mm Hg    pO2, Madhav 29.9 (L) 30.0 - 50.0 mm Hg    HCO3, Venous 24.1 22.0 - 29.0 mmol/L    Total CO2, Venous NOT REPORTED 23.0 - 30.0 mmol/L    Negative Base Excess, Madhav NOT REPORTED 0.0 - 2.0    Positive Base Excess, Madhav 0 0.0 - 3.0    O2 Sat, Madhav 58 (L) 60.0 - 85.0 %    O2 Device/Flow/% NOT REPORTED     Juan Antonio Test NOT REPORTED     Sample Site NOT REPORTED     Mode NOT REPORTED     FIO2 NOT REPORTED     Pt Temp NOT REPORTED     POC pH Temp NOT REPORTED     POC pCO2 Temp NOT REPORTED mm Hg    POC pO2 Temp NOT REPORTED mm Hg       EMERGENCY DEPARTMENT COURSE:        The patient was given the following medications:  Orders Placed This Encounter   Medications    0.9 % sodium chloride bolus    iopamidol (ISOVUE-370) 76 % injection 75 mL    sodium chloride flush 0.9 % injection 10 mL    dilTIAZem injection 20 mg    dilTIAZem 125 mg in dextrose 5 % 125 mL infusion    0.9 % sodium chloride bolus    0.9 % sodium chloride infusion        Vitals:    Vitals:    12/13/21 0541 12/13/21 0718   BP: 125/72 98/77   Pulse: 72 131   Resp: 18    Temp: 97.7 °F (36.5 °C)    TempSrc: Oral    SpO2: 93% 98%   Weight: (!) 149.7 kg (330 lb)    Height: 6' 5\" (1.956 m)      -------------------------  BP: 98/77, Temp: 97.7 °F (36.5 °C), Pulse: 131, Resp: 18    CONSULTS:  None    CRITICAL CARE:   CRITICAL CARE: There was a high probability of clinically significant/life threatening deterioration in this patient's condition which required my urgent intervention. Total critical care time was 45 minutes. This excludes any time for separately reportable procedures. PROCEDURES:  None    DIAGNOSIS/ MDM:   Arben De La Cruz is a 49 y.o. male who presents with altered mental status, shortness of breath, right wrist pain, and melena. Vital signs upon arrival were normal but he subsequently was noted to be in A. fib with RVR and his blood pressure dropped. He was started on IV fluids and diltiazem with a bolus. Septic work-up was initiated. He was typed and screened. VBG is grossly unremarkable. CBC reveals leukocytosis of 21.5 and hemoglobin of 7.7. Since he is actively bleeding I ordered him 1 unit PRBC since I believe his hemoglobin is lagging behind his true levels. CMP shows elevated BUN consistent with upper GI bleed. INR slightly elevated at 1.3. Lactic acid is elevated 2.9. Other labs are pending. Blood cultures sent. Imaging is pending. The patient was signed out to Dr. Shad Corado. FINAL IMPRESSION      1. Gastrointestinal hemorrhage with melena    2.  Atrial fibrillation with RVR ([address] DISPOSITION/PLAN   DISPOSITION  Signed out to Dr. Tabby Colmenares          (Please note that portions of this note were completed with a voice recognitionprogram.  Efforts were made to edit the dictations but occasionally words are mis-transcribed.)    Joel Chou DO, [facility]  Emergency Physician Attending         Joel Chou DO  12/13/21 0722

## 2021-12-13 NOTE — CONSULTS
Initial GI Consult Note  Today's Date and Time: 12/13/2021, 11:01 AM      Chief complain/Reason for consultation:   Melena    History of Present Illness:   Eve De La Cruz is a 49y.o.-year-old  male who was initially admitted on 12/13/2021. Patient seen at the request of Raoul Napier for melena. Patient presented to the emergency room with black stool, nausea some shortness of breath. For last 2 to 3 days he has noticed black stools without any significant abdominal pain. He was nauseous but did not vomit today. He had a bowel movement in the emergency room which was black in color. He is on Xarelto for atrial fibrillation and takes Advil for his arthritis. He never had upper endoscopy or colonoscopy in the past.  Last dose of Xarelto was yesterday. His CT of the abdomen showed gastric contents. He is originally from [place]. I have personally reviewed the past medical history, past surgical history, medications, social history, and family history, and I have updated the database accordingly.   Past Medical History:     Past Medical History:   Diagnosis Date    Arthritis     Juvenile rheumatoid arthritis    Atrial fibrillation (HCC)     Hypertension        Past Surgical  History:     Past Surgical History:   Procedure Laterality Date    ACHILLES TENDON SURGERY      KNEE SURGERY         Medications:      morphine  4 mg IntraVENous Once    pantoprazole (PROTONIX) bolus  80 mg IntraVENous Once    ketoconazole   Topical Daily       Social History:     Social History     Socioeconomic History    Marital status:      Spouse name: Not on file    Number of children: Not on file    Years of education: Not on file    Highest education level: Not on file   Occupational History    Not on file   Tobacco Use    Smoking status: Never Smoker    Smokeless tobacco: Never Used   Vaping Use    Vaping Use: Never used   Substance and Sexual Activity    Alcohol use: Yes     Comment: occasionally    Drug use: Never    Sexual activity: Not on file   Other Topics Concern    Not on file   Social History Narrative    Not on file     Social Determinants of Health     Financial Resource Strain:     Difficulty of Paying Living Expenses: Not on file   Food Insecurity:     Worried About Running Out of Food in the Last Year: Not on file    [place]elia of Food in the Last Year: Not on file   Transportation Needs:     Lack of Transportation (Medical): Not on file    Lack of Transportation (Non-Medical): Not on file   Physical Activity:     Days of Exercise per Week: Not on file    Minutes of Exercise per Session: Not on file   Stress:     Feeling of Stress : Not on file   Social Connections:     Frequency of Communication with Friends and Family: Not on file    Frequency of Social Gatherings with Friends and Family: Not on file    Attends Rastafari Services: Not on file    Active Member of [address] [place] [address] [employer] or Organizations: Not on file    Attends Club or Organization Meetings: Not on file    Marital Status: Not on file   Intimate Partner Violence:     Fear of Current or Ex-Partner: Not on file    Emotionally Abused: Not on file    Physically Abused: Not on file    Sexually Abused: Not on file   Housing Stability:     Unable to Pay for Housing in the Last Year: Not on file    Number of [place] in the Last Year: Not on file    Unstable Housing in the Last Year: Not on file       Family History:   History reviewed. No pertinent family history. Allergies:   Patient has no known allergies. Review of Systems:   Review of systems as per history of present illness. Rest of the review of systems were reviewed and was negative.   Physical Examination :     Patient Vitals for the past 8 hrs:   BP Temp Temp src Pulse Resp SpO2 Height Weight   12/13/21 1024 94/69 -- -- 130 22 99 % -- --   12/13/21 0944 (!) 120/103 -- -- 137 18 -- -- --   12/13/21 0938 -- -- -- 149 23 -- -- --   12/13/21 0915 119/72 -- -- 125 -- 95 % -- --   12/13/21 0832 (!) 103/90 -- -- 130 -- 95 % -- --   12/13/21 0829 -- -- -- 134 -- 95 % -- --   12/13/21 0718 98/77 -- -- 131 -- 98 % -- --   12/13/21 0541 125/72 97.7 °F (36.5 °C) Oral 72 18 93 % 6' 5\" (1.956 m) (!) 330 lb (149.7 kg)     General Appearance: Awake, alert, and in no apparent distress  Head:  Normocephalic, no trauma  Eyes: No icterus, no pallor. Pulmonary/Chest: Breathing normal bilaterally. No accessory muscle use. Abdomen: Soft, non tender. Bowel sounds normal. No organomegaly  All four Extremities: No cyanosis, clubbing, edema, or effusions. Neurologic: No asterixis. Medical Decision Making:   I have independently reviewed/ordered the following labs:  CBC with Differential:   Recent Labs     12/13/21  0555   WBC 21.5*   HGB 7.7*   HCT 24.0*   *   LYMPHOPCT 17*   MONOPCT 6     BMP:   Recent Labs     12/13/21  0555      K 4.6      CO2 22   BUN 44*   CREATININE 1.19     Hepatic Function Panel:  @LABRCNT(PROT:2,LABALBU:2,BILIDIR:2,IBILI:2,BILITOT:2,ALKPHOS:2,ALT:2,AST:2)  )  Lab Results   Component Value Date    RBC 3.03 12/13/2021    WBC 21.5 12/13/2021    TURBIDITY Clear 12/13/2021     Lab Results   Component Value Date    CREATININE 1.19 12/13/2021    GLUCOSE 162 12/13/2021       Imaging: CT of the abdomen was reviewed    Impression :   Melena likely from upper GI bleed likely from peptic ulcer disease due to nonsteroidal use and Xarelto  Anemia of acute blood loss  Obesity Body mass index is 39.13 kg/m². Atrial fibrillation with rapid ventricular response-currently on Cardizem drip    Recommendations   As patient is currently symptomatic-consider giving blood transfusion at least 1 unit. Monitor H&H every 8 hours. I agree with holding Xarelto. We will start the patient on PPI drip for now. We will plan for upper endoscopy tomorrow with Dr Edgard Cunningham. Discussed with the primary team as well as with Dr. Edgard Cunningham.     Thank you for allowing us to participate in the care of this patient. Please call with questions.   Abida Murray MD, Fady Kenny

## 2021-12-14 ENCOUNTER — APPOINTMENT (OUTPATIENT)
Dept: GENERAL RADIOLOGY | Age: 56
DRG: 241 | End: 2021-12-14
Payer: MEDICAID

## 2021-12-14 ENCOUNTER — ANESTHESIA (OUTPATIENT)
Dept: OPERATING ROOM | Age: 56
DRG: 241 | End: 2021-12-14
Payer: MEDICAID

## 2021-12-14 VITALS
DIASTOLIC BLOOD PRESSURE: 55 MMHG | SYSTOLIC BLOOD PRESSURE: 115 MMHG | RESPIRATION RATE: 19 BRPM | TEMPERATURE: 96.8 F | OXYGEN SATURATION: 97 %

## 2021-12-14 PROBLEM — I50.20 HFREF (HEART FAILURE WITH REDUCED EJECTION FRACTION) (HCC): Status: ACTIVE | Noted: 2021-12-14

## 2021-12-14 LAB
-: NORMAL
ABSOLUTE EOS #: 0.17 K/UL (ref 0–0.4)
ABSOLUTE IMMATURE GRANULOCYTE: ABNORMAL K/UL (ref 0–0.3)
ABSOLUTE LYMPH #: 3.98 K/UL (ref 1–4.8)
ABSOLUTE MONO #: 0.69 K/UL (ref 0.1–1.2)
ALBUMIN SERPL-MCNC: 3.3 G/DL (ref 3.5–5.2)
ALBUMIN/GLOBULIN RATIO: 1.1 (ref 1–2.5)
ALP BLD-CCNC: 65 U/L (ref 40–129)
ALT SERPL-CCNC: 10 U/L (ref 5–41)
ANION GAP SERPL CALCULATED.3IONS-SCNC: 13 MMOL/L (ref 9–17)
AST SERPL-CCNC: 9 U/L
BASOPHILS # BLD: 0 % (ref 0–2)
BASOPHILS ABSOLUTE: 0 K/UL (ref 0–0.2)
BILIRUB SERPL-MCNC: 0.56 MG/DL (ref 0.3–1.2)
BUN BLDV-MCNC: 35 MG/DL (ref 6–20)
BUN/CREAT BLD: ABNORMAL (ref 9–20)
CALCIUM SERPL-MCNC: 8.6 MG/DL (ref 8.6–10.4)
CHLORIDE BLD-SCNC: 103 MMOL/L (ref 98–107)
CO2: 22 MMOL/L (ref 20–31)
CREAT SERPL-MCNC: 0.83 MG/DL (ref 0.7–1.2)
CULTURE: NO GROWTH
DIFFERENTIAL TYPE: ABNORMAL
EOSINOPHILS RELATIVE PERCENT: 1 % (ref 1–4)
GFR AFRICAN AMERICAN: >60 ML/MIN
GFR NON-AFRICAN AMERICAN: >60 ML/MIN
GFR SERPL CREATININE-BSD FRML MDRD: ABNORMAL ML/MIN/{1.73_M2}
GFR SERPL CREATININE-BSD FRML MDRD: ABNORMAL ML/MIN/{1.73_M2}
GLUCOSE BLD-MCNC: 101 MG/DL (ref 70–99)
HCT VFR BLD CALC: 20.9 % (ref 41–53)
HCT VFR BLD CALC: 22.9 % (ref 41–53)
HCT VFR BLD CALC: 24.3 % (ref 41–53)
HCT VFR BLD CALC: 24.7 % (ref 41–53)
HCT VFR BLD CALC: 25 % (ref 41–53)
HCT VFR BLD CALC: 27.3 % (ref 41–53)
HEMOGLOBIN: 6.1 G/DL (ref 13.5–17.5)
HEMOGLOBIN: 7.6 G/DL (ref 13.5–17.5)
HEMOGLOBIN: 7.7 G/DL (ref 13.5–17.5)
HEMOGLOBIN: 7.7 G/DL (ref 13.5–17.5)
HEMOGLOBIN: 8.3 G/DL (ref 13.5–17.5)
HEMOGLOBIN: 8.6 G/DL (ref 13.5–17.5)
IMMATURE GRANULOCYTES: ABNORMAL %
IRON SATURATION: ABNORMAL % (ref 20–55)
IRON: 250 UG/DL (ref 59–158)
LYMPHOCYTES # BLD: 23 % (ref 24–44)
Lab: NORMAL
MCH RBC QN AUTO: 26.4 PG (ref 26–34)
MCHC RBC AUTO-ENTMCNC: 31.3 G/DL (ref 31–37)
MCV RBC AUTO: 84.4 FL (ref 80–100)
MONOCYTES # BLD: 4 % (ref 2–11)
MORPHOLOGY: ABNORMAL
MYOGLOBIN: 31 NG/ML (ref 28–72)
NRBC AUTOMATED: ABNORMAL PER 100 WBC
PDW BLD-RTO: 16 % (ref 12.5–15.4)
PLATELET # BLD: 528 K/UL (ref 140–450)
PLATELET ESTIMATE: ABNORMAL
PMV BLD AUTO: 9.8 FL (ref 8–14)
POTASSIUM SERPL-SCNC: 4 MMOL/L (ref 3.7–5.3)
RBC # BLD: 2.88 M/UL (ref 4.5–5.9)
RBC # BLD: ABNORMAL 10*6/UL
REASON FOR REJECTION: NORMAL
SEDIMENTATION RATE, ERYTHROCYTE: 30 MM (ref 0–20)
SEG NEUTROPHILS: 72 % (ref 36–66)
SEGMENTED NEUTROPHILS ABSOLUTE COUNT: 12.46 K/UL (ref 1.8–7.7)
SODIUM BLD-SCNC: 138 MMOL/L (ref 135–144)
SPECIMEN DESCRIPTION: NORMAL
TOTAL IRON BINDING CAPACITY: ABNORMAL UG/DL (ref 250–450)
TOTAL PROTEIN: 6.2 G/DL (ref 6.4–8.3)
UNSATURATED IRON BINDING CAPACITY: <17 UG/DL (ref 112–347)
WBC # BLD: 17.3 K/UL (ref 3.5–11)
WBC # BLD: ABNORMAL 10*3/UL
ZZ NTE CLEAN UP: ORDERED TEST: NORMAL
ZZ NTE WITH NAME CLEAN UP: SPECIMEN SOURCE: NORMAL

## 2021-12-14 PROCEDURE — C9113 INJ PANTOPRAZOLE SODIUM, VIA: HCPCS | Performed by: STUDENT IN AN ORGANIZED HEALTH CARE EDUCATION/TRAINING PROGRAM

## 2021-12-14 PROCEDURE — 2500000003 HC RX 250 WO HCPCS: Performed by: ANESTHESIOLOGY

## 2021-12-14 PROCEDURE — 36415 COLL VENOUS BLD VENIPUNCTURE: CPT

## 2021-12-14 PROCEDURE — 2709999900 HC NON-CHARGEABLE SUPPLY: Performed by: INTERNAL MEDICINE

## 2021-12-14 PROCEDURE — 83540 ASSAY OF IRON: CPT

## 2021-12-14 PROCEDURE — 2580000003 HC RX 258: Performed by: INTERNAL MEDICINE

## 2021-12-14 PROCEDURE — 85018 HEMOGLOBIN: CPT

## 2021-12-14 PROCEDURE — 83550 IRON BINDING TEST: CPT

## 2021-12-14 PROCEDURE — 3700000000 HC ANESTHESIA ATTENDED CARE: Performed by: INTERNAL MEDICINE

## 2021-12-14 PROCEDURE — 3609012400 HC EGD TRANSORAL BIOPSY SINGLE/MULTIPLE: Performed by: INTERNAL MEDICINE

## 2021-12-14 PROCEDURE — 2580000003 HC RX 258: Performed by: ANESTHESIOLOGY

## 2021-12-14 PROCEDURE — 6370000000 HC RX 637 (ALT 250 FOR IP): Performed by: INTERNAL MEDICINE

## 2021-12-14 PROCEDURE — 85014 HEMATOCRIT: CPT

## 2021-12-14 PROCEDURE — 85025 COMPLETE CBC W/AUTO DIFF WBC: CPT

## 2021-12-14 PROCEDURE — 6360000002 HC RX W HCPCS: Performed by: NURSE PRACTITIONER

## 2021-12-14 PROCEDURE — 99232 SBSQ HOSP IP/OBS MODERATE 35: CPT | Performed by: STUDENT IN AN ORGANIZED HEALTH CARE EDUCATION/TRAINING PROGRAM

## 2021-12-14 PROCEDURE — 83874 ASSAY OF MYOGLOBIN: CPT

## 2021-12-14 PROCEDURE — 80053 COMPREHEN METABOLIC PANEL: CPT

## 2021-12-14 PROCEDURE — 7100000000 HC PACU RECOVERY - FIRST 15 MIN: Performed by: INTERNAL MEDICINE

## 2021-12-14 PROCEDURE — 88305 TISSUE EXAM BY PATHOLOGIST: CPT

## 2021-12-14 PROCEDURE — C9113 INJ PANTOPRAZOLE SODIUM, VIA: HCPCS | Performed by: INTERNAL MEDICINE

## 2021-12-14 PROCEDURE — 2060000000 HC ICU INTERMEDIATE R&B

## 2021-12-14 PROCEDURE — 85652 RBC SED RATE AUTOMATED: CPT

## 2021-12-14 PROCEDURE — 6360000002 HC RX W HCPCS

## 2021-12-14 PROCEDURE — 43239 EGD BIOPSY SINGLE/MULTIPLE: CPT | Performed by: INTERNAL MEDICINE

## 2021-12-14 PROCEDURE — 3700000001 HC ADD 15 MINUTES (ANESTHESIA): Performed by: INTERNAL MEDICINE

## 2021-12-14 PROCEDURE — 6360000002 HC RX W HCPCS: Performed by: INTERNAL MEDICINE

## 2021-12-14 PROCEDURE — 2580000003 HC RX 258: Performed by: STUDENT IN AN ORGANIZED HEALTH CARE EDUCATION/TRAINING PROGRAM

## 2021-12-14 PROCEDURE — 7100000001 HC PACU RECOVERY - ADDTL 15 MIN: Performed by: INTERNAL MEDICINE

## 2021-12-14 PROCEDURE — 6360000002 HC RX W HCPCS: Performed by: ANESTHESIOLOGY

## 2021-12-14 PROCEDURE — 6360000002 HC RX W HCPCS: Performed by: STUDENT IN AN ORGANIZED HEALTH CARE EDUCATION/TRAINING PROGRAM

## 2021-12-14 PROCEDURE — 0DB68ZX EXCISION OF STOMACH, VIA NATURAL OR ARTIFICIAL OPENING ENDOSCOPIC, DIAGNOSTIC: ICD-10-PCS | Performed by: INTERNAL MEDICINE

## 2021-12-14 PROCEDURE — 73130 X-RAY EXAM OF HAND: CPT

## 2021-12-14 PROCEDURE — 6370000000 HC RX 637 (ALT 250 FOR IP): Performed by: STUDENT IN AN ORGANIZED HEALTH CARE EDUCATION/TRAINING PROGRAM

## 2021-12-14 RX ORDER — MORPHINE SULFATE 2 MG/ML
1 INJECTION, SOLUTION INTRAMUSCULAR; INTRAVENOUS ONCE
Status: COMPLETED | OUTPATIENT
Start: 2021-12-14 | End: 2021-12-14

## 2021-12-14 RX ORDER — ONDANSETRON 2 MG/ML
4 INJECTION INTRAMUSCULAR; INTRAVENOUS
Status: DISCONTINUED | OUTPATIENT
Start: 2021-12-14 | End: 2021-12-14 | Stop reason: HOSPADM

## 2021-12-14 RX ORDER — EPINEPHRINE 0.1 MG/ML
SYRINGE (ML) INJECTION
Status: DISPENSED
Start: 2021-12-14 | End: 2021-12-14

## 2021-12-14 RX ORDER — FENTANYL CITRATE 50 UG/ML
50 INJECTION, SOLUTION INTRAMUSCULAR; INTRAVENOUS
Status: DISCONTINUED | OUTPATIENT
Start: 2021-12-14 | End: 2021-12-15

## 2021-12-14 RX ORDER — HYDROCODONE BITARTRATE AND ACETAMINOPHEN 5; 325 MG/1; MG/1
1 TABLET ORAL PRN
Status: DISCONTINUED | OUTPATIENT
Start: 2021-12-14 | End: 2021-12-14 | Stop reason: HOSPADM

## 2021-12-14 RX ORDER — MORPHINE SULFATE 1 MG/ML
1 INJECTION, SOLUTION EPIDURAL; INTRATHECAL; INTRAVENOUS EVERY 5 MIN PRN
Status: DISCONTINUED | OUTPATIENT
Start: 2021-12-14 | End: 2021-12-14 | Stop reason: HOSPADM

## 2021-12-14 RX ORDER — MORPHINE SULFATE 2 MG/ML
INJECTION, SOLUTION INTRAMUSCULAR; INTRAVENOUS
Status: COMPLETED
Start: 2021-12-14 | End: 2021-12-14

## 2021-12-14 RX ORDER — SODIUM CHLORIDE, SODIUM LACTATE, POTASSIUM CHLORIDE, CALCIUM CHLORIDE 600; 310; 30; 20 MG/100ML; MG/100ML; MG/100ML; MG/100ML
INJECTION, SOLUTION INTRAVENOUS CONTINUOUS
Status: DISCONTINUED | OUTPATIENT
Start: 2021-12-14 | End: 2021-12-14

## 2021-12-14 RX ORDER — OXYCODONE HYDROCHLORIDE AND ACETAMINOPHEN 5; 325 MG/1; MG/1
2 TABLET ORAL EVERY 8 HOURS PRN
Status: DISCONTINUED | OUTPATIENT
Start: 2021-12-14 | End: 2021-12-15

## 2021-12-14 RX ORDER — MORPHINE SULFATE 2 MG/ML
2 INJECTION, SOLUTION INTRAMUSCULAR; INTRAVENOUS ONCE
Status: COMPLETED | OUTPATIENT
Start: 2021-12-14 | End: 2021-12-14

## 2021-12-14 RX ORDER — SODIUM CHLORIDE 0.9 % (FLUSH) 0.9 %
10 SYRINGE (ML) INJECTION EVERY 12 HOURS SCHEDULED
Status: DISCONTINUED | OUTPATIENT
Start: 2021-12-14 | End: 2021-12-14 | Stop reason: HOSPADM

## 2021-12-14 RX ORDER — SODIUM CHLORIDE 9 MG/ML
25 INJECTION, SOLUTION INTRAVENOUS PRN
Status: DISCONTINUED | OUTPATIENT
Start: 2021-12-14 | End: 2021-12-14 | Stop reason: HOSPADM

## 2021-12-14 RX ORDER — MEPERIDINE HYDROCHLORIDE 50 MG/ML
12.5 INJECTION INTRAMUSCULAR; INTRAVENOUS; SUBCUTANEOUS EVERY 5 MIN PRN
Status: DISCONTINUED | OUTPATIENT
Start: 2021-12-14 | End: 2021-12-14 | Stop reason: HOSPADM

## 2021-12-14 RX ORDER — SODIUM CHLORIDE 0.9 % (FLUSH) 0.9 %
10 SYRINGE (ML) INJECTION PRN
Status: DISCONTINUED | OUTPATIENT
Start: 2021-12-14 | End: 2021-12-14 | Stop reason: HOSPADM

## 2021-12-14 RX ORDER — LIDOCAINE HYDROCHLORIDE 10 MG/ML
INJECTION, SOLUTION EPIDURAL; INFILTRATION; INTRACAUDAL; PERINEURAL PRN
Status: DISCONTINUED | OUTPATIENT
Start: 2021-12-14 | End: 2021-12-14 | Stop reason: SDUPTHER

## 2021-12-14 RX ORDER — DIPHENHYDRAMINE HYDROCHLORIDE 50 MG/ML
12.5 INJECTION INTRAMUSCULAR; INTRAVENOUS
Status: DISCONTINUED | OUTPATIENT
Start: 2021-12-14 | End: 2021-12-14 | Stop reason: HOSPADM

## 2021-12-14 RX ORDER — SODIUM CHLORIDE, SODIUM LACTATE, POTASSIUM CHLORIDE, CALCIUM CHLORIDE 600; 310; 30; 20 MG/100ML; MG/100ML; MG/100ML; MG/100ML
INJECTION, SOLUTION INTRAVENOUS CONTINUOUS PRN
Status: DISCONTINUED | OUTPATIENT
Start: 2021-12-14 | End: 2021-12-14 | Stop reason: SDUPTHER

## 2021-12-14 RX ORDER — PANTOPRAZOLE SODIUM 40 MG/10ML
40 INJECTION, POWDER, LYOPHILIZED, FOR SOLUTION INTRAVENOUS DAILY
Status: DISCONTINUED | OUTPATIENT
Start: 2021-12-14 | End: 2021-12-17 | Stop reason: HOSPADM

## 2021-12-14 RX ORDER — PROMETHAZINE HYDROCHLORIDE 25 MG/ML
6.25 INJECTION, SOLUTION INTRAMUSCULAR; INTRAVENOUS
Status: DISCONTINUED | OUTPATIENT
Start: 2021-12-14 | End: 2021-12-14 | Stop reason: HOSPADM

## 2021-12-14 RX ORDER — OXYCODONE HYDROCHLORIDE AND ACETAMINOPHEN 5; 325 MG/1; MG/1
2 TABLET ORAL ONCE
Status: COMPLETED | OUTPATIENT
Start: 2021-12-14 | End: 2021-12-14

## 2021-12-14 RX ORDER — FENTANYL CITRATE 50 UG/ML
25 INJECTION, SOLUTION INTRAMUSCULAR; INTRAVENOUS EVERY 5 MIN PRN
Status: DISCONTINUED | OUTPATIENT
Start: 2021-12-14 | End: 2021-12-14 | Stop reason: HOSPADM

## 2021-12-14 RX ORDER — SODIUM CHLORIDE 9 MG/ML
10 INJECTION INTRAVENOUS DAILY
Status: DISCONTINUED | OUTPATIENT
Start: 2021-12-14 | End: 2021-12-17 | Stop reason: HOSPADM

## 2021-12-14 RX ORDER — PROPOFOL 10 MG/ML
INJECTION, EMULSION INTRAVENOUS PRN
Status: DISCONTINUED | OUTPATIENT
Start: 2021-12-14 | End: 2021-12-14 | Stop reason: SDUPTHER

## 2021-12-14 RX ORDER — HYDRALAZINE HYDROCHLORIDE 20 MG/ML
5 INJECTION INTRAMUSCULAR; INTRAVENOUS EVERY 10 MIN PRN
Status: DISCONTINUED | OUTPATIENT
Start: 2021-12-14 | End: 2021-12-14 | Stop reason: HOSPADM

## 2021-12-14 RX ORDER — LIDOCAINE HYDROCHLORIDE 10 MG/ML
1 INJECTION, SOLUTION EPIDURAL; INFILTRATION; INTRACAUDAL; PERINEURAL
Status: DISCONTINUED | OUTPATIENT
Start: 2021-12-14 | End: 2021-12-14 | Stop reason: HOSPADM

## 2021-12-14 RX ORDER — HYDROCODONE BITARTRATE AND ACETAMINOPHEN 5; 325 MG/1; MG/1
2 TABLET ORAL PRN
Status: DISCONTINUED | OUTPATIENT
Start: 2021-12-14 | End: 2021-12-14 | Stop reason: HOSPADM

## 2021-12-14 RX ADMIN — SODIUM CHLORIDE, POTASSIUM CHLORIDE, SODIUM LACTATE AND CALCIUM CHLORIDE: 600; 310; 30; 20 INJECTION, SOLUTION INTRAVENOUS at 07:23

## 2021-12-14 RX ADMIN — PROPOFOL 30 MG: 10 INJECTION, EMULSION INTRAVENOUS at 07:35

## 2021-12-14 RX ADMIN — SODIUM CHLORIDE 8 MG/HR: 9 INJECTION, SOLUTION INTRAVENOUS at 06:03

## 2021-12-14 RX ADMIN — KETOCONAZOLE: 20 CREAM TOPICAL at 11:01

## 2021-12-14 RX ADMIN — OXYCODONE HYDROCHLORIDE AND ACETAMINOPHEN 2 TABLET: 5; 325 TABLET ORAL at 10:44

## 2021-12-14 RX ADMIN — AMIODARONE HYDROCHLORIDE 0.5 MG/MIN: 50 INJECTION, SOLUTION INTRAVENOUS at 16:50

## 2021-12-14 RX ADMIN — MORPHINE SULFATE 1 MG: 2 INJECTION, SOLUTION INTRAMUSCULAR; INTRAVENOUS at 02:36

## 2021-12-14 RX ADMIN — PROPOFOL 30 MG: 10 INJECTION, EMULSION INTRAVENOUS at 07:33

## 2021-12-14 RX ADMIN — FENTANYL CITRATE 50 MCG: 50 INJECTION, SOLUTION INTRAMUSCULAR; INTRAVENOUS at 06:39

## 2021-12-14 RX ADMIN — SODIUM CHLORIDE, PRESERVATIVE FREE 10 ML: 5 INJECTION INTRAVENOUS at 10:43

## 2021-12-14 RX ADMIN — AMIODARONE HYDROCHLORIDE 1 MG/MIN: 50 INJECTION, SOLUTION INTRAVENOUS at 11:30

## 2021-12-14 RX ADMIN — PROPOFOL 50 MG: 10 INJECTION, EMULSION INTRAVENOUS at 07:31

## 2021-12-14 RX ADMIN — SODIUM CHLORIDE, POTASSIUM CHLORIDE, SODIUM LACTATE AND CALCIUM CHLORIDE: 600; 310; 30; 20 INJECTION, SOLUTION INTRAVENOUS at 07:11

## 2021-12-14 RX ADMIN — PROPOFOL 50 MG: 10 INJECTION, EMULSION INTRAVENOUS at 07:29

## 2021-12-14 RX ADMIN — MORPHINE SULFATE 2 MG: 2 INJECTION, SOLUTION INTRAMUSCULAR; INTRAVENOUS at 03:12

## 2021-12-14 RX ADMIN — OXYCODONE HYDROCHLORIDE AND ACETAMINOPHEN 2 TABLET: 5; 325 TABLET ORAL at 15:44

## 2021-12-14 RX ADMIN — MORPHINE SULFATE 2 MG: 2 INJECTION, SOLUTION INTRAMUSCULAR; INTRAVENOUS at 08:11

## 2021-12-14 RX ADMIN — FENTANYL CITRATE 50 MCG: 50 INJECTION, SOLUTION INTRAMUSCULAR; INTRAVENOUS at 05:01

## 2021-12-14 RX ADMIN — LIDOCAINE HYDROCHLORIDE 50 MG: 10 INJECTION, SOLUTION EPIDURAL; INFILTRATION; INTRACAUDAL at 07:29

## 2021-12-14 RX ADMIN — DEXTROSE MONOHYDRATE 150 MG: 50 INJECTION, SOLUTION INTRAVENOUS at 11:01

## 2021-12-14 RX ADMIN — SODIUM CHLORIDE, PRESERVATIVE FREE 10 ML: 5 INJECTION INTRAVENOUS at 20:50

## 2021-12-14 RX ADMIN — PANTOPRAZOLE SODIUM 40 MG: 40 INJECTION, POWDER, FOR SOLUTION INTRAVENOUS at 10:44

## 2021-12-14 ASSESSMENT — PAIN DESCRIPTION - PAIN TYPE
TYPE: ACUTE PAIN

## 2021-12-14 ASSESSMENT — PAIN SCALES - GENERAL
PAINLEVEL_OUTOF10: 10
PAINLEVEL_OUTOF10: 10
PAINLEVEL_OUTOF10: 9
PAINLEVEL_OUTOF10: 10
PAINLEVEL_OUTOF10: 5
PAINLEVEL_OUTOF10: 0
PAINLEVEL_OUTOF10: 0
PAINLEVEL_OUTOF10: 4
PAINLEVEL_OUTOF10: 5
PAINLEVEL_OUTOF10: 9
PAINLEVEL_OUTOF10: 0
PAINLEVEL_OUTOF10: 9
PAINLEVEL_OUTOF10: 9
PAINLEVEL_OUTOF10: 0

## 2021-12-14 ASSESSMENT — PULMONARY FUNCTION TESTS
PIF_VALUE: 0
PIF_VALUE: 1
PIF_VALUE: 0

## 2021-12-14 ASSESSMENT — PAIN DESCRIPTION - LOCATION
LOCATION: WRIST

## 2021-12-14 ASSESSMENT — PAIN DESCRIPTION - ORIENTATION
ORIENTATION: RIGHT

## 2021-12-14 ASSESSMENT — PAIN DESCRIPTION - DESCRIPTORS
DESCRIPTORS: ACHING;DISCOMFORT
DESCRIPTORS: ACHING;DISCOMFORT;SHARP
DESCRIPTORS: ACHING;DISCOMFORT
DESCRIPTORS: ACHING;DISCOMFORT

## 2021-12-14 ASSESSMENT — ENCOUNTER SYMPTOMS: SHORTNESS OF BREATH: 1

## 2021-12-14 ASSESSMENT — PAIN DESCRIPTION - ONSET
ONSET: ON-GOING
ONSET: ON-GOING

## 2021-12-14 ASSESSMENT — PAIN DESCRIPTION - FREQUENCY
FREQUENCY: CONTINUOUS

## 2021-12-14 ASSESSMENT — PAIN DESCRIPTION - PROGRESSION
CLINICAL_PROGRESSION: GRADUALLY IMPROVING

## 2021-12-14 NOTE — ANESTHESIA POSTPROCEDURE EVALUATION
Department of Anesthesiology  Postprocedure Note    Patient: Talia De La Cruz  MRN: [id number]  YOB: 1965  Date of evaluation: 12/14/2021  Time:  7:42 AM     Procedure Summary     Date: 12/14/21 Room / Location: [facility] 4777 / [address] [facility]    Anesthesia Start: 0975 Anesthesia Stop: 5344    Procedure: EGD BIOPSY (N/A ) Diagnosis: (Gastrointestinal hemorrhage with melena [K92.1]; Atrial fibrillation with RVR ([address] [I48.91]; Altered mental status, unspecified altered mental status type [R41.82]; Anemia, blood loss)    Surgeons: Lubna Mitchell MD Responsible Provider: Sydnie Bernard MD    Anesthesia Type: MAC ASA Status: 3          Anesthesia Type: MAC    Vanesa Phase I: Vanesa Score: 10    Vanesa Phase II:      Last vitals: Reviewed and per EMR flowsheets.        Anesthesia Post Evaluation    Patient location during evaluation: PACU  Patient participation: complete - patient participated  Level of consciousness: awake and alert  Airway patency: patent  Nausea & Vomiting: no nausea and no vomiting  Complications: no  Cardiovascular status: hemodynamically stable  Respiratory status: nasal cannula and spontaneous ventilation  Hydration status: euvolemic  Multimodal analgesia pain management approach

## 2021-12-14 NOTE — PROGRESS NOTES
Late entry: Telephone update provided to spouse in [place]. , during afternoon. Second conversation informed that Dr. Marcellus Lala had not been able to get back to her due to his patient load at time. But that he would contact her as soon as it was possible. And thanked her for her patience.

## 2021-12-14 NOTE — PLAN OF CARE
Problem: Skin Integrity:  Goal: Will show no infection signs and symptoms  Description: Will show no infection signs and symptoms  12/14/2021 0031 by Jeanie Brooks RN  Outcome: Ongoing  12/14/2021 0030 by Jeanie Brooks RN  Outcome: Ongoing  Goal: Absence of new skin breakdown  Description: Absence of new skin breakdown  12/14/2021 0031 by Jeanie Brooks RN  Outcome: Ongoing  12/14/2021 0030 by Jeanie Brooks RN  Outcome: Ongoing     Problem: Discharge Planning:  Goal: Discharged to appropriate level of care  Description: Discharged to appropriate level of care  Outcome: Ongoing     Problem:  Bowel Function - Altered:  Goal: Bowel elimination is within specified parameters  Description: Bowel elimination is within specified parameters  Outcome: Ongoing     Problem: Fluid Volume - Imbalance:  Goal: Will show no signs and symptoms of excessive bleeding  Description: Will show no signs and symptoms of excessive bleeding  Outcome: Ongoing  Goal: Absence of imbalanced fluid volume signs and symptoms  Description: Absence of imbalanced fluid volume signs and symptoms  Outcome: Ongoing     Problem: Nausea/Vomiting:  Goal: Absence of nausea/vomiting  Description: Absence of nausea/vomiting  Outcome: Ongoing  Goal: Able to drink  Description: Able to drink  Outcome: Ongoing  Goal: Able to eat  Description: Able to eat  Outcome: Ongoing  Goal: Ability to achieve adequate nutritional intake will improve  Description: Ability to achieve adequate nutritional intake will improve  Outcome: Ongoing

## 2021-12-14 NOTE — OP NOTE
Operative Note      Patient: Molly De La Cruz  YOB: 1965  MRN: [id number]    Date of Procedure: 12/14/2021    Pre-Op Diagnosis: Gastrointestinal hemorrhage with melena [K92.1]; Atrial fibrillation with RVR ([facility] [facility] [I48.91]; Altered mental status, unspecified altered mental status type [R41.82]; Anemia, blood loss    Post-Op Diagnosis: Peptic ulcer disease, no active bleeding, no high risk lesions       Procedure(s):  EGD BIOPSY    Surgeon(s):  Chi Meyer MD    Assistant:   * No surgical staff found *    Anesthesia: Monitor Anesthesia Care    Estimated Blood Loss (mL): Minimal    Complications: None    Specimens:   ID Type Source Tests Collected by Time Destination   A : STOMACH BIOPSY **RULE-OUT H.PYLORI** Tissue Stomach SURGICAL PATHOLOGY Chi Meyer MD 12/14/2021 6566        Implants:  * No implants in log *      Drains: * No LDAs found *          [facility]    [facility]    EGD    PROCEDURE DATE: 12/14/21    REFERRING PHYSICIAN: No ref. provider found     PRIMARY CARE PROVIDER: No primary care provider on file. ATTENDING PHYSICIAN: Chi Meyer MD     HISTORY: Mr. Molly De La Cruz is a 49 y.o. male who presents to the  Endoscopy unit for upper endoscopy. The patient's clinical history is remarkable for RA, HTN, obestiy, A-fib on AC, referred for fatigue, SOB, and melena. Planned for EGD. He is currently medically stable and appropriate for the planned procedure. PREOPERATIVE DIAGNOSIS: Suspected GI bleeding     PROCEDURES:   1) Transoral Upper Endoscopy with cold biopsy of the stomach. POSTOPERATIVE DIAGNOSIS:     Peptic Ulcer Disease--no active bleeding, clean based ulcer and erosion in the antrum    1) 10mm and 7mm antral, clean based ulcer (Saul III) identified. No visible vessel. No hematin spot. Small antral erosion, superficial, punched out appearance, identified in the pre-pyloric region.   Superficial cold biopsy taken of the gastric mucosa to evaluate for H. Pylori  2) Mild gastritis, no duodenal sources of hemorrhage or bleeding. 3) Normal appearing esophagus     MEDICATIONS:   MAC per anesthesia     EBL: <10cc    INSTRUMENT: Olympus GIF-H190  flexible Gastroscope. PREPARATION: The nature and character of the procedure as well as risks, benefits, and alternatives were discussed with the patient and informed consent was obtained. Complications were said to include, but were not limited to: medication allergy, medication reaction, cardiovascular and respiratory problems, bleeding, perforation, infection, and/or missed diagnosis. Following arrival in the endoscopy room, the patient was placed in the left lateral decubitus position and final time-out accomplished in the presence of the nursing staff. Baseline vital signs were obtained and reviewed, and IV sedation was subsequently initiated. FINDINGS:   Esophagus: The esophagus was inspected to the Z-line. The endoscopic exam showed normal appearing esophagus and GEJ. Stomach: The stomach was inspected in both forward and retroflex fashion and was appropriately distensible. The cardia, fundus, incisura, antrum and pylorus were identified via direct visualization. The endoscopic exam showed clean based antral ulcers and erosion, no active bleeding. Duodenum: The proximal small bowel was inspected through the bulb, sweep, and second portion of the duodenum. The endoscopic exam showed normal appearing. Antral ulcer:              Duodenum:      IMPRESSION:    Peptic Ulcer Disease--no active bleeding, clean based ulcer and erosion in the antrum    1) 10mm and 7mm antral, clean based ulcer (Saul III) identified. No visible vessel. No hematin spot. Small antral erosion, superficial, punched out appearance, identified in the pre-pyloric region. Superficial cold biopsy taken of the gastric mucosa to evaluate for H. Pylori  2) Mild gastritis, no duodenal sources of hemorrhage or bleeding.    3) Normal appearing esophagus       RECOMMENDATIONS:   1) Continue IV PPI 40mg BID during hospital course, discharge on Protonix 40mg PO BID. Minimize or avoid NSAID use. May restart AC after 24 hrs. Place on CLD, advance as tolerated. [id number] Colleen Anderson for discharge from GI standpoint if Hg stable on repeat H/H.  2) Return back to medical floor. Maninder Rivera MD  [facility]   12/14/21    this note is created with the assistance of a speech recognition program.  While intending to generate a document that actually reflects the content of the visit, the document can still have some errors including those of syntax and sound a like substitutions which may escape proof reading. It such instances, actual meaning can be extrapolated by contextual diversion. The patient was counseled at length about the risks of hima Covid-19 during their perioperative period and any recovery window from their procedure. The patient was made aware that hima Covid-19  may worsen their prognosis for recovering from their procedure  and lend to a higher morbidity and/or mortality risk. All material risks, benefits, and reasonable alternatives including postponing the procedure were discussed. The patient DOES wish to proceed with the procedure at this time.     Electronically signed by Maninder Rivera MD on 12/14/2021 at 8:16 AM

## 2021-12-14 NOTE — PROGRESS NOTES
protonix drip not running throughout dayshift, per day RN \"pt was bending arm and they were unable to start protonix drip because it is not compatible with cardizem and blood was running through the other line. \" Ivin Head NP notified. writer started new piv in right AC and started protonix drip - see MAR. RN educated pt on importance of gtt and need to start new PIV, Pt calm and understanding. Pt placed back in bed from bathroom. Bed alarm on and call light placed near pt.

## 2021-12-14 NOTE — PROGRESS NOTES
Late Entry: Pt to PCU- Diltiazem drip @ 10 mg/hr. - pt OOB multiple times w/ gown off- telemetry unit on floor. Re-direction given, tele box / leads replaced. Gown placed back on. Attempts to calm pt mostly unsuccessful to include beligerent outbursts. MD et Nurse Mngr informed.

## 2021-12-14 NOTE — CARE COORDINATION
Case Management Initial Discharge Plan  Mavis De La Cruz,             Met with:patient to discuss discharge plans. Information verified: address, contacts, phone number, , insurance Yes  Insurance Provider: none - has financial aid forms    Emergency Contact/Next of Kin name & number: wife Gus Irby [phone]  Who are involved in patient's support system? wife    PCP: No primary care provider on file. Dr. Soco Khan" in [facility] Date of last visit: past year      Discharge Planning    Living Arrangements:      Patient able to perform ADL's:Independent    Current Services (outpatient & in home) none  DME equipment: none  DME provider:     Is patient receiving oral anticoagulation therapy? Yes - Xarelto    If indicated:   Physician managing anticoagulation treatment: PCP  Where does patient obtain lab work for ATC treatment? n/a      Potential Assistance Needed:       Patient agreeable to home care: No  [place] of choice provided:  no    Prior SNF/Rehab Placement and Facility: no  Agreeable to SNF/Rehab: No  [place] of choice provided: n/a     Evaluation: yes    Expected Discharge date:       Patient expects to be discharged to: If home: is the family and/or caregiver wiling & able to provide support at home? n/a  Who will be providing this support? independent    Follow Up Appointment: Best Day/ Time:      Transportation provider: self  Transportation arrangements needed for discharge: Yes - lives in [place]    Readmission Risk              Risk of Unplanned Readmission:  10         Does patient have a readmission risk score greater than 14?: No  If yes, follow-up appointment must be made within 7 days of discharge. Goals of Care: manage heart rate, manage bleeding      Educated patient on transitional options, provided freedom of choice and are agreeable with plan      Discharge Plan: Home - lives in [place] with spouse. Independent, is a . Unsure how he will get home. Currently without insurance - has financial aid forms, encouraged to complete. Follow for med assist. SW consult for assistance with transport home.            Electronically signed by Elaine Hinojosa RN on 12/14/21 at 11:24 AM EST

## 2021-12-14 NOTE — PROGRESS NOTES
Protonix drip not started from noon due to IV incompatibility / priority to run Diltiazem per forearm access. IV placement @ R AC constantly occluded per ED report due to pt bending arm . In lieu of difficulty with management of this pt / staff not available for new IV start, would start IV protonix after 1st unit of blood transfused/ pt in more calmed state. Discussed this issue with both Vivian Kenney MD to make aware.

## 2021-12-14 NOTE — ANESTHESIA PRE PROCEDURE
IntraVENous 2 times per day Nuvia Nguyễn MD   10 mL at 12/13/21 2010    sodium chloride flush 0.9 % injection 5-40 mL  5-40 mL IntraVENous PRN Nuvia Nguyễn MD        0.9 % sodium chloride infusion  25 mL IntraVENous PRN Nuvia Nguyễn MD        ondansetron (ZOFRAN-ODT) disintegrating tablet 4 mg  4 mg Oral Q8H PRN Nuvia Nguyễn MD        Or    [facility] injection 4 mg  4 mg IntraVENous Q6H PRN Nuvia Nguyễn MD        acetaminophen (TYLENOL) tablet 650 mg  650 mg Oral Q6H PRN Nuvia Nguyễn MD   650 mg at 12/13/21 2010    Or    acetaminophen (TYLENOL) suppository 650 mg  650 mg Rectal Q6H PRN Nuvia Nguyễn MD        pantoprazole (PROTONIX) 80 mg in sodium chloride 0.9 % 50 mL bolus  80 mg IntraVENous Once Jacky Dahl MD        pantoprazole (PROTONIX) 80 mg in sodium chloride 0.9 % 100 mL infusion  8 mg/hr IntraVENous Continuous Jacky Dahl MD 10 mL/hr at 12/14/21 0603 8 mg/hr at 12/14/21 0603    ketoconazole (NIZORAL) 2 % cream   Topical Daily Nuvia Nguyễn MD        sodium chloride flush 0.9 % injection 10 mL  10 mL IntraVENous PRN Lion Medel MD   10 mL at 12/13/21 1455    0.9 % sodium chloride infusion   IntraVENous PRN Nuvia Nguyễn MD           Allergies:  No Known Allergies    Problem List:    Patient Active Problem List   Diagnosis Code    GI bleed K92.2    Hypertension I10    Rheumatoid arthritis ([facility] [address] M06.9    Shortness of breath R06.02    Atrial fibrillation (HCC) I48.91    Morbid obesity ([address] E66.01    Tinea cruris B35.6       Past Medical History:        Diagnosis Date    Arthritis     Juvenile rheumatoid arthritis    Atrial fibrillation ([facility] [address]     Hyperlipidemia 12/13/2021    Hypertension        Past Surgical History:        Procedure Laterality Date    ACHILLES TENDON SURGERY      KNEE SURGERY         Social History:    Social History     Tobacco Use    Smoking status: Never Smoker    Smokeless tobacco: Never Used   Substance Use Topics    Alcohol use:  Yes Comment: occasionally                                Counseling given: Not Answered      Vital Signs (Current):   Vitals:    12/13/21 2258 12/13/21 2313 12/14/21 0316 12/14/21 0656   BP: 115/86 127/82 129/79 (!) 142/80   Pulse: 127 104 122 112   Resp: 20 17 22   Temp: 98.4 °F (36.9 °C) 97.7 °F (36.5 °C) 97.7 °F (36.5 °C) 98 °F (36.7 °C)   TempSrc: Oral Oral Oral Temporal   SpO2:   99% 94%   Weight:       Height:                                                  BP Readings from Last 3 Encounters:   12/14/21 (!) 142/80       NPO Status: Time of last liquid consumption: 2200                        Time of last solid consumption: 1200                        Date of last liquid consumption: 12/13/21                        Date of last solid food consumption: 12/11/21    BMI:   Wt Readings from Last 3 Encounters:   12/13/21 (!) 330 lb (149.7 kg)     Body mass index is 39.13 kg/m². CBC:   Lab Results   Component Value Date    WBC 17.3 12/14/2021    RBC 2.88 12/14/2021    HGB 7.6 12/14/2021    HCT 24.3 12/14/2021    MCV 84.4 12/14/2021    RDW 16.0 12/14/2021     12/14/2021       CMP:   Lab Results   Component Value Date     12/14/2021    K 4.0 12/14/2021     12/14/2021    CO2 22 12/14/2021    BUN 35 12/14/2021    CREATININE 0.83 12/14/2021    GFRAA >60 12/14/2021    LABGLOM >60 12/14/2021    GLUCOSE 101 12/14/2021    PROT 6.2 12/14/2021    CALCIUM 8.6 12/14/2021    BILITOT 0.56 12/14/2021    ALKPHOS 65 12/14/2021    AST 9 12/14/2021    ALT 10 12/14/2021       POC Tests: No results for input(s): POCGLU, POCNA, POCK, POCCL, POCBUN, POCHEMO, POCHCT in the last 72 hours.     Coags:   Lab Results   Component Value Date    PROTIME 13.6 12/13/2021    INR 1.3 12/13/2021    APTT 30.7 12/13/2021       HCG (If Applicable): No results found for: PREGTESTUR, PREGSERUM, HCG, HCGQUANT     ABGs: No results found for: PHART, PO2ART, QOX4ROD, ORU2MWM, BEART, [id number]     Type & Screen (If Applicable):  No results found for: Vianney Pfeiffer    Drug/Infectious Status (If Applicable):  No results found for: HIV, HEPCAB    COVID-19 Screening (If Applicable):   Lab Results   Component Value Date    COVID19 Not Detected 12/13/2021           Anesthesia Evaluation  Patient summary reviewed and Nursing notes reviewed no history of anesthetic complications:   Airway: Mallampati: III  TM distance: >3 FB   Neck ROM: full  Mouth opening: > = 3 FB Dental: normal exam         Pulmonary:normal exam  breath sounds clear to auscultation  (+) shortness of breath:                             Cardiovascular:    (+) hypertension: no interval change, dysrhythmias: atrial fibrillation, hyperlipidemia      ECG reviewed  Rhythm: irregular  Rate: abnormal  Echocardiogram reviewed                  Neuro/Psych:   Negative Neuro/Psych ROS              GI/Hepatic/Renal:   (+) morbid obesity         ROS comment: GI bleed. Endo/Other:    (+) : arthritis: rheumatoid. , .                 Abdominal:   (+) obese,     Abdomen: soft. Vascular: negative vascular ROS. Other Findings:           Anesthesia Plan      MAC     ASA 3             Anesthetic plan and risks discussed with patient. Plan discussed with CRNA.                   Moriah Lassiter MD   12/14/2021

## 2021-12-14 NOTE — CARE COORDINATION
SW consulted due to concerns regarding return home. Patient currently lives in [place]. SW attempted to assess, patient reports he is \"very tired\". Patient reports plan to fly home via airport at this time. Patient will notify [facility] if assistance needed looking into transportation options.
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
## 2021-12-15 ENCOUNTER — APPOINTMENT (OUTPATIENT)
Dept: MRI IMAGING | Age: 56
DRG: 241 | End: 2021-12-15
Payer: MEDICAID

## 2021-12-15 LAB
ABO/RH: NORMAL
ABSOLUTE EOS #: 0.2 K/UL (ref 0–0.4)
ABSOLUTE IMMATURE GRANULOCYTE: ABNORMAL K/UL (ref 0–0.3)
ABSOLUTE LYMPH #: 2.4 K/UL (ref 1–4.8)
ABSOLUTE MONO #: 1 K/UL (ref 0.1–1.2)
ALBUMIN SERPL-MCNC: 3.3 G/DL (ref 3.5–5.2)
ALBUMIN/GLOBULIN RATIO: 1 (ref 1–2.5)
ALP BLD-CCNC: 70 U/L (ref 40–129)
ALT SERPL-CCNC: 9 U/L (ref 5–41)
ANION GAP SERPL CALCULATED.3IONS-SCNC: 10 MMOL/L (ref 9–17)
ANTIBODY SCREEN: NEGATIVE
ARM BAND NUMBER: NORMAL
AST SERPL-CCNC: 7 U/L
BASOPHILS # BLD: 1 % (ref 0–2)
BASOPHILS ABSOLUTE: 0.1 K/UL (ref 0–0.2)
BILIRUB SERPL-MCNC: 0.35 MG/DL (ref 0.3–1.2)
BLD PROD TYP BPU: NORMAL
BLD PROD TYP BPU: NORMAL
BUN BLDV-MCNC: 23 MG/DL (ref 6–20)
BUN/CREAT BLD: ABNORMAL (ref 9–20)
CALCIUM SERPL-MCNC: 8.4 MG/DL (ref 8.6–10.4)
CHLORIDE BLD-SCNC: 100 MMOL/L (ref 98–107)
CO2: 23 MMOL/L (ref 20–31)
CREAT SERPL-MCNC: 0.89 MG/DL (ref 0.7–1.2)
CROSSMATCH RESULT: NORMAL
CROSSMATCH RESULT: NORMAL
DIFFERENTIAL TYPE: ABNORMAL
DISPENSE STATUS BLOOD BANK: NORMAL
DISPENSE STATUS BLOOD BANK: NORMAL
EOSINOPHILS RELATIVE PERCENT: 1 % (ref 1–4)
EXPIRATION DATE: NORMAL
GFR AFRICAN AMERICAN: >60 ML/MIN
GFR NON-AFRICAN AMERICAN: >60 ML/MIN
GFR SERPL CREATININE-BSD FRML MDRD: ABNORMAL ML/MIN/{1.73_M2}
GFR SERPL CREATININE-BSD FRML MDRD: ABNORMAL ML/MIN/{1.73_M2}
GLUCOSE BLD-MCNC: 124 MG/DL (ref 70–99)
HCT VFR BLD CALC: 22.6 % (ref 41–53)
HEMOGLOBIN: 7.3 G/DL (ref 13.5–17.5)
IMMATURE GRANULOCYTES: ABNORMAL %
LYMPHOCYTES # BLD: 12 % (ref 24–44)
MCH RBC QN AUTO: 25.9 PG (ref 26–34)
MCHC RBC AUTO-ENTMCNC: 32.5 G/DL (ref 31–37)
MCV RBC AUTO: 79.7 FL (ref 80–100)
MONOCYTES # BLD: 5 % (ref 2–11)
NRBC AUTOMATED: ABNORMAL PER 100 WBC
PDW BLD-RTO: 17.3 % (ref 12.5–15.4)
PLATELET # BLD: 497 K/UL (ref 140–450)
PLATELET ESTIMATE: ABNORMAL
PMV BLD AUTO: 7.6 FL (ref 6–12)
POTASSIUM SERPL-SCNC: 3.9 MMOL/L (ref 3.7–5.3)
RBC # BLD: 2.84 M/UL (ref 4.5–5.9)
RBC # BLD: ABNORMAL 10*6/UL
SEG NEUTROPHILS: 81 % (ref 36–66)
SEGMENTED NEUTROPHILS ABSOLUTE COUNT: 16.1 K/UL (ref 1.8–7.7)
SODIUM BLD-SCNC: 133 MMOL/L (ref 135–144)
SURGICAL PATHOLOGY REPORT: NORMAL
TOTAL PROTEIN: 6.5 G/DL (ref 6.4–8.3)
TRANSFUSION STATUS: NORMAL
TRANSFUSION STATUS: NORMAL
UNIT DIVISION: 0
UNIT DIVISION: 0
UNIT NUMBER: NORMAL
UNIT NUMBER: NORMAL
WBC # BLD: 19.7 K/UL (ref 3.5–11)
WBC # BLD: ABNORMAL 10*3/UL

## 2021-12-15 PROCEDURE — 2060000000 HC ICU INTERMEDIATE R&B

## 2021-12-15 PROCEDURE — 6360000002 HC RX W HCPCS: Performed by: NURSE PRACTITIONER

## 2021-12-15 PROCEDURE — 80053 COMPREHEN METABOLIC PANEL: CPT

## 2021-12-15 PROCEDURE — 6360000002 HC RX W HCPCS: Performed by: STUDENT IN AN ORGANIZED HEALTH CARE EDUCATION/TRAINING PROGRAM

## 2021-12-15 PROCEDURE — 36415 COLL VENOUS BLD VENIPUNCTURE: CPT

## 2021-12-15 PROCEDURE — 85025 COMPLETE CBC W/AUTO DIFF WBC: CPT

## 2021-12-15 PROCEDURE — 99231 SBSQ HOSP IP/OBS SF/LOW 25: CPT | Performed by: INTERNAL MEDICINE

## 2021-12-15 PROCEDURE — 6370000000 HC RX 637 (ALT 250 FOR IP): Performed by: NURSE PRACTITIONER

## 2021-12-15 PROCEDURE — 70553 MRI BRAIN STEM W/O & W/DYE: CPT

## 2021-12-15 PROCEDURE — 2580000003 HC RX 258: Performed by: STUDENT IN AN ORGANIZED HEALTH CARE EDUCATION/TRAINING PROGRAM

## 2021-12-15 PROCEDURE — 2580000003 HC RX 258: Performed by: INTERNAL MEDICINE

## 2021-12-15 PROCEDURE — 99232 SBSQ HOSP IP/OBS MODERATE 35: CPT | Performed by: STUDENT IN AN ORGANIZED HEALTH CARE EDUCATION/TRAINING PROGRAM

## 2021-12-15 PROCEDURE — 2500000003 HC RX 250 WO HCPCS: Performed by: INTERNAL MEDICINE

## 2021-12-15 PROCEDURE — C9113 INJ PANTOPRAZOLE SODIUM, VIA: HCPCS | Performed by: STUDENT IN AN ORGANIZED HEALTH CARE EDUCATION/TRAINING PROGRAM

## 2021-12-15 PROCEDURE — 6370000000 HC RX 637 (ALT 250 FOR IP): Performed by: STUDENT IN AN ORGANIZED HEALTH CARE EDUCATION/TRAINING PROGRAM

## 2021-12-15 PROCEDURE — 99252 IP/OBS CONSLTJ NEW/EST SF 35: CPT | Performed by: ORTHOPAEDIC SURGERY

## 2021-12-15 RX ORDER — DIGOXIN 0.25 MG/ML
250 INJECTION INTRAMUSCULAR; INTRAVENOUS EVERY 4 HOURS
Status: COMPLETED | OUTPATIENT
Start: 2021-12-15 | End: 2021-12-15

## 2021-12-15 RX ORDER — AMIODARONE HYDROCHLORIDE 200 MG/1
200 TABLET ORAL 2 TIMES DAILY
Status: DISCONTINUED | OUTPATIENT
Start: 2021-12-15 | End: 2021-12-17 | Stop reason: HOSPADM

## 2021-12-15 RX ORDER — AMIODARONE HYDROCHLORIDE 200 MG/1
200 TABLET ORAL DAILY
Status: DISCONTINUED | OUTPATIENT
Start: 2021-12-15 | End: 2021-12-15

## 2021-12-15 RX ORDER — LORAZEPAM 2 MG/ML
1 INJECTION INTRAMUSCULAR
Status: COMPLETED | OUTPATIENT
Start: 2021-12-15 | End: 2021-12-15

## 2021-12-15 RX ORDER — SODIUM CHLORIDE 0.9 % (FLUSH) 0.9 %
10 SYRINGE (ML) INJECTION PRN
Status: DISCONTINUED | OUTPATIENT
Start: 2021-12-15 | End: 2021-12-17 | Stop reason: HOSPADM

## 2021-12-15 RX ORDER — OXYCODONE HYDROCHLORIDE AND ACETAMINOPHEN 5; 325 MG/1; MG/1
2 TABLET ORAL EVERY 6 HOURS PRN
Status: DISCONTINUED | OUTPATIENT
Start: 2021-12-15 | End: 2021-12-17 | Stop reason: HOSPADM

## 2021-12-15 RX ORDER — DILTIAZEM HYDROCHLORIDE 120 MG/1
120 CAPSULE, COATED, EXTENDED RELEASE ORAL DAILY
Status: DISCONTINUED | OUTPATIENT
Start: 2021-12-15 | End: 2021-12-17 | Stop reason: HOSPADM

## 2021-12-15 RX ORDER — LORAZEPAM 2 MG/ML
1 INJECTION INTRAMUSCULAR ONCE
Status: COMPLETED | OUTPATIENT
Start: 2021-12-16 | End: 2021-12-16

## 2021-12-15 RX ADMIN — OXYCODONE HYDROCHLORIDE AND ACETAMINOPHEN 2 TABLET: 5; 325 TABLET ORAL at 00:09

## 2021-12-15 RX ADMIN — KETOCONAZOLE: 20 CREAM TOPICAL at 08:29

## 2021-12-15 RX ADMIN — SODIUM CHLORIDE, PRESERVATIVE FREE 10 ML: 5 INJECTION INTRAVENOUS at 20:42

## 2021-12-15 RX ADMIN — DIGOXIN 250 MCG: 250 INJECTION, SOLUTION INTRAMUSCULAR; INTRAVENOUS; PARENTERAL at 14:21

## 2021-12-15 RX ADMIN — OXYCODONE HYDROCHLORIDE AND ACETAMINOPHEN 2 TABLET: 5; 325 TABLET ORAL at 08:20

## 2021-12-15 RX ADMIN — RIVAROXABAN 20 MG: 10 TABLET, FILM COATED ORAL at 08:19

## 2021-12-15 RX ADMIN — DIGOXIN 250 MCG: 250 INJECTION, SOLUTION INTRAMUSCULAR; INTRAVENOUS; PARENTERAL at 09:41

## 2021-12-15 RX ADMIN — HYDROMORPHONE HYDROCHLORIDE 0.5 MG: 1 INJECTION, SOLUTION INTRAMUSCULAR; INTRAVENOUS; SUBCUTANEOUS at 03:02

## 2021-12-15 RX ADMIN — HYDROMORPHONE HYDROCHLORIDE 0.5 MG: 1 INJECTION, SOLUTION INTRAMUSCULAR; INTRAVENOUS; SUBCUTANEOUS at 10:43

## 2021-12-15 RX ADMIN — DILTIAZEM HYDROCHLORIDE 120 MG: 120 CAPSULE, COATED, EXTENDED RELEASE ORAL at 08:19

## 2021-12-15 RX ADMIN — AMIODARONE HYDROCHLORIDE 200 MG: 200 TABLET ORAL at 08:19

## 2021-12-15 RX ADMIN — LORAZEPAM 1 MG: 2 INJECTION INTRAMUSCULAR; INTRAVENOUS at 11:03

## 2021-12-15 RX ADMIN — DILTIAZEM HYDROCHLORIDE 15 MG/HR: 5 INJECTION INTRAVENOUS at 01:54

## 2021-12-15 RX ADMIN — METOPROLOL TARTRATE 25 MG: 25 TABLET, FILM COATED ORAL at 09:42

## 2021-12-15 RX ADMIN — METOPROLOL TARTRATE 25 MG: 25 TABLET, FILM COATED ORAL at 20:41

## 2021-12-15 RX ADMIN — SODIUM CHLORIDE, PRESERVATIVE FREE 10 ML: 5 INJECTION INTRAVENOUS at 08:20

## 2021-12-15 RX ADMIN — AMIODARONE HYDROCHLORIDE 200 MG: 200 TABLET ORAL at 20:41

## 2021-12-15 RX ADMIN — OXYCODONE HYDROCHLORIDE AND ACETAMINOPHEN 2 TABLET: 5; 325 TABLET ORAL at 14:20

## 2021-12-15 RX ADMIN — SODIUM CHLORIDE, PRESERVATIVE FREE 10 ML: 5 INJECTION INTRAVENOUS at 10:43

## 2021-12-15 RX ADMIN — PANTOPRAZOLE SODIUM 40 MG: 40 INJECTION, POWDER, FOR SOLUTION INTRAVENOUS at 08:20

## 2021-12-15 ASSESSMENT — PAIN DESCRIPTION - PROGRESSION
CLINICAL_PROGRESSION: GRADUALLY IMPROVING
CLINICAL_PROGRESSION: NOT CHANGED
CLINICAL_PROGRESSION: GRADUALLY IMPROVING

## 2021-12-15 ASSESSMENT — PAIN SCALES - GENERAL
PAINLEVEL_OUTOF10: 5
PAINLEVEL_OUTOF10: 5
PAINLEVEL_OUTOF10: 9
PAINLEVEL_OUTOF10: 9
PAINLEVEL_OUTOF10: 7
PAINLEVEL_OUTOF10: 10
PAINLEVEL_OUTOF10: 8
PAINLEVEL_OUTOF10: 9
PAINLEVEL_OUTOF10: 8
PAINLEVEL_OUTOF10: 5
PAINLEVEL_OUTOF10: 5

## 2021-12-15 ASSESSMENT — PAIN DESCRIPTION - PAIN TYPE
TYPE: ACUTE PAIN

## 2021-12-15 ASSESSMENT — PAIN DESCRIPTION - LOCATION
LOCATION: WRIST

## 2021-12-15 ASSESSMENT — PAIN DESCRIPTION - FREQUENCY: FREQUENCY: CONTINUOUS

## 2021-12-15 ASSESSMENT — PAIN DESCRIPTION - DESCRIPTORS: DESCRIPTORS: ACHING;DISCOMFORT

## 2021-12-15 ASSESSMENT — PAIN DESCRIPTION - ONSET: ONSET: ON-GOING

## 2021-12-15 ASSESSMENT — PAIN DESCRIPTION - DIRECTION: RADIATING_TOWARDS: UPPER ARM

## 2021-12-15 ASSESSMENT — PAIN DESCRIPTION - ORIENTATION
ORIENTATION: RIGHT
ORIENTATION: RIGHT
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
## 2021-12-15 NOTE — CARE COORDINATION
[facility] ICU Quality Flow/Interdisciplinary Rounds Progress Note    Quality Flow Rounds held on December 15, 2021 at [address] Attending:  Bedside Nurse, , Nursing Unit Leadership, Respiratory Therapy, Physical Therapy, Occupational Therapy and Spiritual Care/    Anticipated Discharge Date:  Expected Discharge Date: 12/15/21    Anticipated Discharge Disposition: home - lives in [place]    Readmission Risk              Risk of Unplanned Readmission:  11           Discussed patient goal for the day, patient clinical progression, and barriers to discharge.   The following Goal(s) of the Day/Commitment(s) have been identified:  PT/OT, ortho consult - needs MRI brain       Taylor De Leon RN  December 15, 2021
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
## 2021-12-15 NOTE — PROGRESS NOTES
Dr. Radha Jon with neurology and Dr. Leena Wadsworth with otho messaged regarding consults. Will round. Patient currently Maxed out on Cardizem with HR in 110s. No oral medications available and cardiology is not consulted. Dr. Carmen Treviño updated. Will place orders.

## 2021-12-15 NOTE — PROGRESS NOTES
Occupational [address]  Occupational Therapy Not Seen Note    DATE: 12/15/2021    NAME: Susan De La Cruz  MRN: [id number]   : 1965      Patient not seen this date for Occupational Therapy due to: Attempted in AM- Ortho consult; going for MRI this morning.      Next Scheduled Treatment: Will check back 2021    Electronically signed by Easton Judge OT on 12/15/2021 at 4:58 PM

## 2021-12-15 NOTE — PLAN OF CARE
Problem: Skin Integrity:  Goal: Absence of new skin breakdown  Description: Absence of new skin breakdown  Outcome: Ongoing   No new skin breakdown noted, no signs/symptoms of infection, continue to monitor labwork including WBC, medications ordered   Problem: Fluid Volume - Imbalance:  Goal: Will show no signs and symptoms of excessive bleeding  Description: Will show no signs and symptoms of excessive bleeding  Outcome: Ongoing   No excessive bleeding noted this shift  Problem: Nausea/Vomiting:  Goal: Absence of nausea/vomiting  Description: Absence of nausea/vomiting  Outcome: Ongoing   Pt denies any nausea/vomiting this shift  Problem: Pain:  Goal: Control of acute pain  Description: Control of acute pain  Outcome: Ongoing   pain rated on scale 0-10, pain controlled with medication/repositioning

## 2021-12-15 NOTE — PROGRESS NOTES
Physical Therapy        Physical Therapy Cancel Note      DATE: 12/15/2021    NAME: Hans De La Cruz  MRN: [id number]   : 1965      Patient not seen this date for Physical Therapy due to: Other: Ortho consult; going for MRI this morning. Will check in PM as time allows.        Electronically signed by Librado Leija PT on 12/15/2021 at 4:52 PM

## 2021-12-15 NOTE — PROGRESS NOTES
[facility]    Consult                        Today's Date: 12/15/2021  Patient Name: Marcy De La Cruz  Date of admission: 12/13/2021  5:51 AM  Patient's age: 49 y.o., 1965  Admission Dx: GI bleed [K92.2]  Anemia, blood loss [D50.0]  Atrial fibrillation with RVR (HCC) [I48.91]  Gastrointestinal hemorrhage with melena [K92.1]  Acute pain of right wrist [M25.531]  Altered mental status, unspecified altered mental status type [R41.82]    Reason for Consult:  Cardiac evaluation    Requesting Physician: Melva Lopez MD    CHIEF COMPLAINT:  AFib     History Obtained From:  patient, electronic medical record    HISTORY OF PRESENT ILLNESS:       Marcy De La Cruz is a 49 y.o. Non- / Karenann Amble a past medical history of atrial fibrillation (on Xarelto), rheumatoid arthritis (on chronic prednisone and NSAIDs) and morbid obesity who presented to the emergency department complaining of shortness of breath and dark, tarry bowel movements. The patient states that his symptoms began several days ago and have progressively worsened. He is a  who receives most of his medical care in [place]. In the ED, the patient was mildly hypotensive and ill-appearing. He was found to have an acute blood loss anemia with a hemoglobin of 7.7. Fecal occult blood was positive. He is admitted to internal medicine for further management of acute blood loss anemia secondary to GI bleed.       Past Medical History:   has a past medical history of Arthritis, Atrial fibrillation ([address] Hyperlipidemia, and Hypertension. Past Surgical History:   has a past surgical history that includes knee surgery; Achilles tendon surgery; Upper gastrointestinal endoscopy (12/14/2021); and Upper gastrointestinal endoscopy (N/A, 12/14/2021). Home Medications:    Prior to Admission medications    Medication Sig Start Date End Date Taking?  Authorizing Provider   amLODIPine (NORVASC) 5 MG tablet Take 5 mg by mouth daily   Yes Historical Provider, MD   Loperamide-Simethicone (IMODIUM ADVANCED PO) Take by mouth   Yes Historical Provider, MD   gabapentin (NEURONTIN) 100 MG capsule Take 100 mg by mouth 2 times daily. Yes Historical Provider, MD   metoprolol succinate (TOPROL XL) 50 MG extended release tablet Take 50 mg by mouth 2 times daily   Yes Historical Provider, MD   meloxicam (MOBIC) 7.5 MG tablet Take 7.5 mg by mouth as needed for Pain   Yes Historical Provider, MD   methotrexate (RHEUMATREX) 2.5 MG chemo tablet Take 7.5 mg by mouth once a week   Yes Historical Provider, MD   rivaroxaban (XARELTO) 20 MG TABS tablet Take 20 mg by mouth Daily with supper   Yes Historical Provider, MD   ibuprofen (ADVIL;MOTRIN) 200 MG tablet Take 200 mg by mouth every 6 hours as needed for Pain   Yes Historical Provider, MD       Allergies:  Patient has no known allergies. Social History:   reports that he has never smoked. He has never used smokeless tobacco. He reports current alcohol use. He reports that he does not use drugs. Family History: family history is not on file. No h/o sudden cardiac death. No for premature CAD    REVIEW OF SYSTEMS:    · Constitutional: there has been no unanticipated weight loss. There's been No change in energy level, No change in activity level. · Eyes: No visual changes or diplopia. No scleral icterus. · ENT: No Headaches, hearing loss or vertigo. No mouth sores or sore throat. · Cardiovascular: see above  · Respiratory: see above  · Gastrointestinal: No abdominal pain, appetite loss, blood in stools. · Genitourinary: No dysuria, trouble voiding, or hematuria. · Musculoskeletal:  No gait disturbance, No weakness or joint complaints. · Integumentary: No rash or pruritis. · Neurological: No headache or diplopia. No tingling  · Psychiatric: No anxiety, or depression. · Endocrine: No temperature intolerance.    · Hematologic/Lymphatic: No abnormal bruising or bleeding, blood clots or swollen lymph nodes.  · Allergic/Immunologic: No nasal congestion or hives. PHYSICAL EXAM:      BP (!) 146/85   Pulse 92   Temp 98.5 °F (36.9 °C) (Oral)   Resp 15   Ht 6' 5\" (1.956 m)   Wt (!) 330 lb (149.7 kg)   SpO2 97%   BMI 39.13 kg/m²    Constitutional and General Appearance: alert, cooperative, no distress and appears stated age  HEENT: PERRL, no cervical lymphadenopathy. No masses palpable. Normal oral mucosa  Respiratory:  · Normal excursion and expansion without use of accessory muscles  · Resp Auscultation: Good respiratory effort. No for increased work of breathing. On auscultation: clear to auscultation bilaterally  Cardiovascular:  · Heart tones are crisp and normal. regular S1 and S2.  · Jugular venous pulsation Normal  · The carotid upstroke is normal in amplitude and contour without delay or bruit   Abdomen:   · soft  · Bowel sounds present  Extremities:  ·  No edema  Neurological:  · Alert and oriented. DATA:    Diagnostics:    EKG: normal EKG, normal sinus rhythm, atrial fibrillation, rate 111, unchanged from previous tracings. ECHO: 12/13/21     Summary  Technically difficult study. Suggest Definity contrast due to poor  endocardial definition. Left ventricle is normal in size and moderate left ventricular hypertrophy. Global left ventricular systolic function is low normal Estimated ejection  fraction is 45-50 %. Unable to exclude wall motion abnormalities due to poor endocardial  definition and increased heart rate. Right atrial dilatation. Right ventricular dilatation with normal systolic function. Trivial to mild mitral regurgitation. Trivial pulmonic insufficiency. Aortic root is mildly dilated. Stress Test: not obtained. Cardiac Angiography: not obtained.     Labs:     CBC:   Recent Labs     12/14/21  0414 12/14/21  1031 12/14/21  2234 12/15/21  0438   WBC 17.3*  --   --  19.7*   HGB 7.6*   < > 8.3* 7.3*   HCT 24.3*   < > 25.0* 22.6*   *  --   --  497*    < > = values in this interval not displayed. BMP:   Recent Labs     12/14/21  0414 12/15/21  0438    133*   K 4.0 3.9   CO2 22 23   BUN 35* 23*   CREATININE 0.83 0.89   LABGLOM >60 >60   GLUCOSE 101* 124*     BNP: No results for input(s): BNP in the last 72 hours. PT/INR:   Recent Labs     12/13/21  0555   PROTIME 13.6*   INR 1.3     APTT:  Recent Labs     12/13/21 0555   APTT 30.7     CARDIAC ENZYMES:No results for input(s): CKTOTAL, CKMB, CKMBINDEX, TROPONINI in the last 72 hours. FASTING LIPID PANEL:No results found for: HDL, LDLDIRECT, LDLCALC, TRIG  LIVER PROFILE:  Recent Labs     12/14/21  0414 12/15/21  0438   AST 9 7   ALT 10 9   LABALBU 3.3* 3.3*       IMPRESSION:    Patient Active Problem List   Diagnosis    GI bleed    Hypertension    [facility]    Shortness of breath    Atrial fibrillation (HCC)    Morbid obesity (HCC)    Tinea cruris    HFrEF (heart failure with reduced ejection fraction) ([facility]       RECOMMENDATIONS:  1. Afib with RVR. Remains elevated. Amiodarone gtt off and start on PO. Weaning cardizem gtt. Will give IV dig x 2 dose today. Likely RVR secondary to anemia. Will add lopressor 25mg BID. Increase PO amiodarone to BID. Continue Xarelto. 2. ECHO reviewed. 3. Keep > 4 and Mag > 2. Recommend HGB > 8       Discussed with patient and Nurse.   Shaista Selby, [address]  Cardiology Consult           [phone]
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
## 2021-12-15 NOTE — PROGRESS NOTES
GI [facility] 21 Gastroenterology  Progress Note     . SUBJECTIVE:  No bowel movement for the last 48 hours for patient  BUN declining to 23 from 44  Hemoglobin relatively stable to 7.3 g/dL  No signs of active bleed  Upper endoscopy was performed yesterday where the patient was identified to have clean-based ulcerations. Likely NSAID induced. Gastric biopsies were negative for H. pylori. Patient is tolerating diet without issue  Remains on PPI 40 mg IV twice daily    REASON FOR CONSULTATION: Suspected GI bleed      HISTORY OF PRESENT ILLNESS:         74-year-old male with a past medical history of juvenile rheumatoid arthritis, atrial fibrillation, hyperlipidemia, hypertension on anticoagulation, , admitted to [facility] with episodes of melena and suspicion for upper GI bleed. Patient was misusing Advil due to recent right upper extremity pain and possible prior fracture. Underwent upper endoscopy where the patient was identified to have peptic ulcer disease likely NSAID induced. PAST MEDICAL HISTORY:  Past Medical History:   Diagnosis Date    Arthritis     Juvenile rheumatoid arthritis    Atrial fibrillation ([facility] [address]     Hyperlipidemia 12/13/2021    Hypertension      Past Surgical History:   Procedure Laterality Date    ACHILLES TENDON SURGERY      KNEE SURGERY      UPPER GASTROINTESTINAL ENDOSCOPY  12/14/2021    UPPER GASTROINTESTINAL ENDOSCOPY N/A 12/14/2021    EGD BIOPSY performed by Norberto Severs, MD at [address]:  No Known Allergies    HOME MEDICATIONS:  Prior to Admission medications    Medication Sig Start Date End Date Taking? Authorizing Provider   amLODIPine (NORVASC) 5 MG tablet Take 5 mg by mouth daily   Yes Historical Provider, MD   Loperamide-Simethicone (IMODIUM ADVANCED PO) Take by mouth   Yes Historical Provider, MD   gabapentin (NEURONTIN) 100 MG capsule Take 100 mg by mouth 2 times daily.    Yes Historical file     Social Determinants of Health     Financial Resource Strain:     Difficulty of Paying Living Expenses: Not on file   Food Insecurity:     Worried About Running Out of Food in the Last Year: Not on file    [place] of Food in the Last Year: Not on file   Transportation Needs:     Lack of Transportation (Medical): Not on file    Lack of Transportation (Non-Medical): Not on file   Physical Activity:     Days of Exercise per Week: Not on file    Minutes of Exercise per Session: Not on file   Stress:     Feeling of Stress : Not on file   Social Connections:     Frequency of Communication with Friends and Family: Not on file    Frequency of Social Gatherings with Friends and Family: Not on file    Attends Church Services: Not on file    Active Member of [address] [place] [address] [employer] or Organizations: Not on file    Attends Club or Organization Meetings: Not on file    Marital Status: Not on file   Intimate Partner Violence:     Fear of Current or Ex-Partner: Not on file    Emotionally Abused: Not on file    Physically Abused: Not on file    Sexually Abused: Not on file   Housing Stability:     Unable to Pay for Housing in the Last Year: Not on file    Number of [place] in the Last Year: Not on file    Unstable Housing in the Last Year: Not on file       FAMILY HISTORY:   History reviewed. No pertinent family history. REVIEW OF SYSTEMS:     Constitutional: No fever, no chills, no lethargy, no weakness. HEENT:  No headache, otalgia, itchy eyes, nasal discharge or sore throat. Cardiac:  No chest pain, dyspnea, orthopnea or PND. Chest:   No cough, phlegm or wheezing. Abdomen:  No abdominal pain, nausea or vomiting. Neuro:  No focal weakness, abnormal movements or seizure like activity. Skin:   No rashes, no itching. :   No hematuria, no pyuria, no dysuria, no flank pain. Extremities:  No swelling or joint pains. ROS was otherwise negative except as mentioned in the [address]      PHYSICAL EXAM:    BP (!) 151/83   Pulse 99   Temp 98.2 °F (36.8 °C)   Resp 16   Ht 6' 5\" (1.956 m)   Wt (!) 330 lb (149.7 kg)   SpO2 96%   BMI 39.13 kg/m²   . TMAX[24]    General: Well developed, Well nourished, No apparent distress  Head:  Normocephalic, Atraumatic  EENT: EOMI, Sclera not icteric, Oropharynx moist  Neck:  Supple, Trachea midline  Lungs:CTA Bilaterally  Heart: RRR, No murmur, No rub, No gallop, PMI nondisplaced. Abdomen:Soft, Non tender, Not distended, BS WNL,  No masses. Ext:No clubbing. No cyanosis. No edema. Skin: No rashes. No jaundice. No stigmata of liver disease. Neuro:  A&O x Three, No focal neurological deficits    LABS and IMAGING:     Hemotological labs:   ANEMIA STUDIES  Recent Labs     12/14/21 0414   LABIRON Unable to calculate due to low iron binding result. TIBC Unable to calculate due to low iron binding result. CBC  Recent Labs     12/13/21  0555 12/13/21  1606 12/14/21  0414 12/14/21  0414 12/14/21  1031 12/14/21  1626 12/14/21  1718 12/14/21  2234 12/15/21  0438   WBC 21.5*  --  17.3*  --   --   --   --   --  19.7*   HGB 7.7*   < > 7.6*   < > 8.6* 6.1* 7.7* 8.3* 7.3*   MCV 79.3*  --  84.4  --   --   --   --   --  79.7*   RDW 17.7*  --  16.0*  --   --   --   --   --  17.3*   *  --  528*  --   --   --   --   --  497*    < > = values in this interval not displayed.        PT/INR  Recent Labs     12/13/21 0555   PROTIME 13.6*   INR 1.3       BMP  Recent Labs     12/13/21  0555 12/14/21  0414 12/15/21  0438    138 133*   K 4.6 4.0 3.9    103 100   CO2 22 22 23   BUN 44* 35* 23*   CREATININE 1.19 0.83 0.89   GLUCOSE 162* 101* 124*   CALCIUM 9.0 8.6 8.4*       LIVER WORK UP  Hepatitis Functional Panel:  Recent Labs     12/15/21  0438   ALKPHOS 70   ALT 9   AST 7   PROT 6.5   BILITOT 0.35   LABALBU 3.3*       AMYLASE/LIPASE/AMMONIA  Recent Labs     12/13/21  0822   AMMONIA 46       Acute Hepatitis Panel   No results found for: HEPBSAG, HEPCAB, HEPBIGM, HEPAIGM    HCV Genotype   No results found for: HEPATITISCGENOTYPE    HCV Quantitative   No results found for: HCVQNT    Metabolic work up:  Ceruloplasmin  AA work up:  Alpha 1 antitrypsin   No results found for: A1A  AMA:  No results found for: MITOAB    ASMA:  No results found for: SMOOTHMUSCAB    ANCA:    PRADIP:  No results found for: PRADIP    Anti - Liver/Kidney Ab:  No results found for: LIVER-KIDNEYMICROSOMALAB    Ceruloplasmin:  No results found for: CERULOPLSM    Celiac panel:  No results found for: TISSTRNTIIGG, TTGIGA, IGA    Cancer Markers:  CEA:    No results for input(s): CEA in the last 72 hours. Ca 125:   No results for input(s):  in the last 72 hours. Ca 19-9:     Invalid input(s):   AFP: No results for input(s): AFP in the last 72 hours. ASSESSMENT and PLAN:     61-year-old male with a past medical history of juvenile rheumatoid arthritis, atrial fibrillation, hyperlipidemia, hypertension on anticoagulation, , admitted to [facility] with episodes of melena and suspicion for upper GI bleed. Patient was misusing Advil due to recent right upper extremity pain and possible prior fracture. Underwent upper endoscopy where the patient was identified to have peptic ulcer disease likely NSAID induced. Recommendation:  -Patient to continue with Protonix 40 mg IV twice daily during hospital course, may transition over to 40 mg of Protonix p.o. twice daily for minimum of 8 weeks  -Advance diet as tolerated  -Patient may resume anticoagulation if there remains to sequential hemoglobins that are stable.  -Avoid misuse of NSAIDs. Avoid GERD triggers in diet.  -Patient may follow-up in GI clinic upon disposition. Will sign off, please do not hesitate to contact our service for any residual questions. Thank you for allowing us to take part in the patients care  Contact GI for any remaining questions, we are available.      Electronically signed by:  Jeffry Downey MD MD   THE [facility] Gastroenterology  12/15/2021    4:36 PM     his note is created with the assistance of a speech recognition program.  While intending to generate a document that actually reflects the content of the visit, the document can still have some errors including those of syntax and sound a like substitutions which may escape proof reading. It such instances, actual meaning can be extrapolated by contextual diversion.

## 2021-12-15 NOTE — PLAN OF CARE
Problem: Skin Integrity:  Goal: Will show no infection signs and symptoms  Description: Will show no infection signs and symptoms  Outcome: Ongoing   Skin assessment preformed. Pt turned every 2 hours with heals elevated off bed. Waffle mattress in place. Will continue to monitor. Problem: Pain:  Goal: Pain level will decrease  Description: Pain level will decrease  Outcome: Ongoing  Pain scale preformed per protocol and pt treated for pain as documented. Education given.     Problem: Fluid Volume - Imbalance:  Goal: Will show no signs and symptoms of excessive bleeding  Description: Will show no signs and symptoms of excessive bleeding  Outcome: Ongoing

## 2021-12-16 VITALS
HEART RATE: 116 BPM | TEMPERATURE: 99.5 F | RESPIRATION RATE: 20 BRPM | WEIGHT: 315 LBS | OXYGEN SATURATION: 95 % | SYSTOLIC BLOOD PRESSURE: 143 MMHG | DIASTOLIC BLOOD PRESSURE: 78 MMHG | BODY MASS INDEX: 37.19 KG/M2 | HEIGHT: 77 IN

## 2021-12-16 LAB
ABSOLUTE EOS #: 0.1 K/UL (ref 0–0.4)
ABSOLUTE IMMATURE GRANULOCYTE: ABNORMAL K/UL (ref 0–0.3)
ABSOLUTE LYMPH #: 2.1 K/UL (ref 1–4.8)
ABSOLUTE MONO #: 0.8 K/UL (ref 0.1–1.2)
ANION GAP SERPL CALCULATED.3IONS-SCNC: 13 MMOL/L (ref 9–17)
BASOPHILS # BLD: 1 % (ref 0–2)
BASOPHILS ABSOLUTE: 0.1 K/UL (ref 0–0.2)
BUN BLDV-MCNC: 16 MG/DL (ref 6–20)
BUN/CREAT BLD: ABNORMAL (ref 9–20)
CALCIUM SERPL-MCNC: 8.7 MG/DL (ref 8.6–10.4)
CHLORIDE BLD-SCNC: 100 MMOL/L (ref 98–107)
CO2: 21 MMOL/L (ref 20–31)
CREAT SERPL-MCNC: 0.92 MG/DL (ref 0.7–1.2)
DIFFERENTIAL TYPE: ABNORMAL
EOSINOPHILS RELATIVE PERCENT: 1 % (ref 1–4)
GFR AFRICAN AMERICAN: >60 ML/MIN
GFR NON-AFRICAN AMERICAN: >60 ML/MIN
GFR SERPL CREATININE-BSD FRML MDRD: ABNORMAL ML/MIN/{1.73_M2}
GFR SERPL CREATININE-BSD FRML MDRD: ABNORMAL ML/MIN/{1.73_M2}
GLUCOSE BLD-MCNC: 155 MG/DL (ref 70–99)
HCT VFR BLD CALC: 22 % (ref 41–53)
HEMOGLOBIN: 7.1 G/DL (ref 13.5–17.5)
IMMATURE GRANULOCYTES: ABNORMAL %
LYMPHOCYTES # BLD: 12 % (ref 24–44)
MCH RBC QN AUTO: 26.3 PG (ref 26–34)
MCHC RBC AUTO-ENTMCNC: 32.1 G/DL (ref 31–37)
MCV RBC AUTO: 81.7 FL (ref 80–100)
MONOCYTES # BLD: 5 % (ref 2–11)
NRBC AUTOMATED: ABNORMAL PER 100 WBC
PDW BLD-RTO: 18.2 % (ref 12.5–15.4)
PLATELET # BLD: 444 K/UL (ref 140–450)
PLATELET ESTIMATE: ABNORMAL
PMV BLD AUTO: 7.5 FL (ref 6–12)
POTASSIUM SERPL-SCNC: 3.8 MMOL/L (ref 3.7–5.3)
RBC # BLD: 2.69 M/UL (ref 4.5–5.9)
RBC # BLD: ABNORMAL 10*6/UL
SEG NEUTROPHILS: 81 % (ref 36–66)
SEGMENTED NEUTROPHILS ABSOLUTE COUNT: 14.3 K/UL (ref 1.8–7.7)
SODIUM BLD-SCNC: 134 MMOL/L (ref 135–144)
WBC # BLD: 17.4 K/UL (ref 3.5–11)
WBC # BLD: ABNORMAL 10*3/UL

## 2021-12-16 PROCEDURE — 6360000002 HC RX W HCPCS: Performed by: STUDENT IN AN ORGANIZED HEALTH CARE EDUCATION/TRAINING PROGRAM

## 2021-12-16 PROCEDURE — 85025 COMPLETE CBC W/AUTO DIFF WBC: CPT

## 2021-12-16 PROCEDURE — 97116 GAIT TRAINING THERAPY: CPT

## 2021-12-16 PROCEDURE — 97166 OT EVAL MOD COMPLEX 45 MIN: CPT

## 2021-12-16 PROCEDURE — 99239 HOSP IP/OBS DSCHRG MGMT >30: CPT | Performed by: STUDENT IN AN ORGANIZED HEALTH CARE EDUCATION/TRAINING PROGRAM

## 2021-12-16 PROCEDURE — 80162 ASSAY OF DIGOXIN TOTAL: CPT

## 2021-12-16 PROCEDURE — 80048 BASIC METABOLIC PNL TOTAL CA: CPT

## 2021-12-16 PROCEDURE — 97535 SELF CARE MNGMENT TRAINING: CPT

## 2021-12-16 PROCEDURE — C9113 INJ PANTOPRAZOLE SODIUM, VIA: HCPCS | Performed by: STUDENT IN AN ORGANIZED HEALTH CARE EDUCATION/TRAINING PROGRAM

## 2021-12-16 PROCEDURE — 2580000003 HC RX 258: Performed by: STUDENT IN AN ORGANIZED HEALTH CARE EDUCATION/TRAINING PROGRAM

## 2021-12-16 PROCEDURE — 6360000002 HC RX W HCPCS: Performed by: NURSE PRACTITIONER

## 2021-12-16 PROCEDURE — 6370000000 HC RX 637 (ALT 250 FOR IP): Performed by: NURSE PRACTITIONER

## 2021-12-16 PROCEDURE — 2060000000 HC ICU INTERMEDIATE R&B

## 2021-12-16 PROCEDURE — 97162 PT EVAL MOD COMPLEX 30 MIN: CPT

## 2021-12-16 PROCEDURE — 36415 COLL VENOUS BLD VENIPUNCTURE: CPT

## 2021-12-16 PROCEDURE — 6370000000 HC RX 637 (ALT 250 FOR IP): Performed by: STUDENT IN AN ORGANIZED HEALTH CARE EDUCATION/TRAINING PROGRAM

## 2021-12-16 RX ORDER — SPIRONOLACTONE 25 MG/1
25 TABLET ORAL DAILY
Status: DISCONTINUED | OUTPATIENT
Start: 2021-12-16 | End: 2021-12-17 | Stop reason: HOSPADM

## 2021-12-16 RX ORDER — DIGOXIN 0.25 MG/ML
250 INJECTION INTRAMUSCULAR; INTRAVENOUS EVERY 4 HOURS
Status: DISPENSED | OUTPATIENT
Start: 2021-12-16 | End: 2021-12-16

## 2021-12-16 RX ORDER — DILTIAZEM HYDROCHLORIDE 120 MG/1
120 CAPSULE, COATED, EXTENDED RELEASE ORAL DAILY
Qty: 30 CAPSULE | Refills: 3 | Status: SHIPPED | OUTPATIENT
Start: 2021-12-17

## 2021-12-16 RX ORDER — FUROSEMIDE 10 MG/ML
40 INJECTION INTRAMUSCULAR; INTRAVENOUS ONCE
Status: COMPLETED | OUTPATIENT
Start: 2021-12-16 | End: 2021-12-16

## 2021-12-16 RX ORDER — SPIRONOLACTONE 25 MG/1
25 TABLET ORAL DAILY
Qty: 30 TABLET | Refills: 3 | Status: SHIPPED | OUTPATIENT
Start: 2021-12-16

## 2021-12-16 RX ORDER — PANTOPRAZOLE SODIUM 40 MG/1
40 TABLET, DELAYED RELEASE ORAL
Qty: 90 TABLET | Refills: 1 | Status: SHIPPED | OUTPATIENT
Start: 2021-12-16

## 2021-12-16 RX ORDER — AMIODARONE HYDROCHLORIDE 200 MG/1
200 TABLET ORAL 2 TIMES DAILY
Qty: 60 TABLET | Refills: 0 | Status: SHIPPED | OUTPATIENT
Start: 2021-12-16 | End: 2022-01-15

## 2021-12-16 RX ORDER — FUROSEMIDE 40 MG/1
40 TABLET ORAL DAILY
Qty: 60 TABLET | Refills: 3 | Status: SHIPPED | OUTPATIENT
Start: 2021-12-16

## 2021-12-16 RX ADMIN — PANTOPRAZOLE SODIUM 40 MG: 40 INJECTION, POWDER, FOR SOLUTION INTRAVENOUS at 09:38

## 2021-12-16 RX ADMIN — KETOCONAZOLE: 20 CREAM TOPICAL at 09:41

## 2021-12-16 RX ADMIN — OXYCODONE HYDROCHLORIDE AND ACETAMINOPHEN 2 TABLET: 5; 325 TABLET ORAL at 16:09

## 2021-12-16 RX ADMIN — SPIRONOLACTONE 25 MG: 25 TABLET, FILM COATED ORAL at 16:09

## 2021-12-16 RX ADMIN — SODIUM CHLORIDE, PRESERVATIVE FREE 10 ML: 5 INJECTION INTRAVENOUS at 09:39

## 2021-12-16 RX ADMIN — DIGOXIN 250 MCG: 0.25 INJECTION INTRAMUSCULAR; INTRAVENOUS at 09:39

## 2021-12-16 RX ADMIN — DILTIAZEM HYDROCHLORIDE 120 MG: 120 CAPSULE, COATED, EXTENDED RELEASE ORAL at 09:39

## 2021-12-16 RX ADMIN — METOPROLOL TARTRATE 25 MG: 25 TABLET, FILM COATED ORAL at 09:40

## 2021-12-16 RX ADMIN — METOPROLOL TARTRATE 25 MG: 25 TABLET, FILM COATED ORAL at 20:58

## 2021-12-16 RX ADMIN — OXYCODONE HYDROCHLORIDE AND ACETAMINOPHEN 2 TABLET: 5; 325 TABLET ORAL at 09:47

## 2021-12-16 RX ADMIN — RIVAROXABAN 20 MG: 10 TABLET, FILM COATED ORAL at 09:46

## 2021-12-16 RX ADMIN — OXYCODONE HYDROCHLORIDE AND ACETAMINOPHEN 2 TABLET: 5; 325 TABLET ORAL at 01:54

## 2021-12-16 RX ADMIN — AMIODARONE HYDROCHLORIDE 200 MG: 200 TABLET ORAL at 20:58

## 2021-12-16 RX ADMIN — FUROSEMIDE 40 MG: 10 INJECTION, SOLUTION INTRAMUSCULAR; INTRAVENOUS at 09:39

## 2021-12-16 RX ADMIN — AMIODARONE HYDROCHLORIDE 200 MG: 200 TABLET ORAL at 09:40

## 2021-12-16 RX ADMIN — LORAZEPAM 1 MG: 2 INJECTION INTRAMUSCULAR; INTRAVENOUS at 03:04

## 2021-12-16 ASSESSMENT — PAIN DESCRIPTION - PAIN TYPE
TYPE: ACUTE PAIN

## 2021-12-16 ASSESSMENT — PAIN DESCRIPTION - LOCATION
LOCATION: WRIST
LOCATION: WRIST;HAND
LOCATION: GENERALIZED

## 2021-12-16 ASSESSMENT — PAIN - FUNCTIONAL ASSESSMENT
PAIN_FUNCTIONAL_ASSESSMENT: PREVENTS OR INTERFERES SOME ACTIVE ACTIVITIES AND ADLS
PAIN_FUNCTIONAL_ASSESSMENT: PREVENTS OR INTERFERES WITH ALL ACTIVE AND SOME PASSIVE ACTIVITIES

## 2021-12-16 ASSESSMENT — PAIN DESCRIPTION - DIRECTION: RADIATING_TOWARDS: R SHOULDER

## 2021-12-16 ASSESSMENT — PAIN SCALES - GENERAL
PAINLEVEL_OUTOF10: 10
PAINLEVEL_OUTOF10: 7
PAINLEVEL_OUTOF10: 0
PAINLEVEL_OUTOF10: 3
PAINLEVEL_OUTOF10: 8
PAINLEVEL_OUTOF10: 0

## 2021-12-16 ASSESSMENT — PAIN DESCRIPTION - ORIENTATION
ORIENTATION: RIGHT
ORIENTATION: RIGHT

## 2021-12-16 ASSESSMENT — PAIN DESCRIPTION - DESCRIPTORS
DESCRIPTORS: ACHING
DESCRIPTORS: ACHING
DESCRIPTORS: ACHING;DISCOMFORT

## 2021-12-16 ASSESSMENT — PAIN DESCRIPTION - PROGRESSION
CLINICAL_PROGRESSION: NOT CHANGED
CLINICAL_PROGRESSION: NOT CHANGED

## 2021-12-16 ASSESSMENT — PAIN DESCRIPTION - FREQUENCY
FREQUENCY: CONTINUOUS
FREQUENCY: CONTINUOUS

## 2021-12-16 ASSESSMENT — PAIN DESCRIPTION - ONSET
ONSET: ON-GOING
ONSET: ON-GOING
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
## 2021-12-16 NOTE — PLAN OF CARE
Problem: Skin Integrity:  Goal: Will show no infection signs and symptoms  Description: Will show no infection signs and symptoms  12/16/2021 0216 by Senait Dunn RN  Outcome: Ongoing  12/15/2021 1803 by Trudy Sahu RN  Outcome: Ongoing  Goal: Absence of new skin breakdown  Description: Absence of new skin breakdown  12/16/2021 0216 by Senait Dunn RN  Outcome: Ongoing  12/15/2021 1803 by Trudy Sahu RN  Outcome: Ongoing     Problem: Discharge Planning:  Goal: Discharged to appropriate level of care  Description: Discharged to appropriate level of care  12/16/2021 0216 by Senait Dunn RN  Outcome: Ongoing  12/15/2021 1803 by Trudy Sahu RN  Outcome: Ongoing     Problem:  Bowel Function - Altered:  Goal: Bowel elimination is within specified parameters  Description: Bowel elimination is within specified parameters  12/16/2021 0216 by Senait Dunn RN  Outcome: Ongoing  12/15/2021 1803 by Trudy Sahu RN  Outcome: Ongoing     Problem: Fluid Volume - Imbalance:  Goal: Will show no signs and symptoms of excessive bleeding  Description: Will show no signs and symptoms of excessive bleeding  12/16/2021 0216 by Senait Dunn RN  Outcome: Ongoing  12/15/2021 1803 by Trudy Sahu RN  Outcome: Ongoing  Goal: Absence of imbalanced fluid volume signs and symptoms  Description: Absence of imbalanced fluid volume signs and symptoms  12/16/2021 0216 by Senait Dunn RN  Outcome: Ongoing  12/15/2021 1803 by Trudy Sahu RN  Outcome: Ongoing

## 2021-12-16 NOTE — PROGRESS NOTES
surgical history that includes knee surgery; Achilles tendon surgery; Upper gastrointestinal endoscopy (12/14/2021); and Upper gastrointestinal endoscopy (N/A, 12/14/2021). Restrictions  Restrictions/Precautions  Restrictions/Precautions: Fall Risk, Up as Tolerated  Required Braces or Orthoses?: No  Position Activity Restriction  Other position/activity restrictions: Per orthopedics: Edema control right hand and wrist; Gentle ROM restoration right hand and wrist. No aggressive strengthening right hand/wrist.    Subjective   General  Patient assessed for rehabilitation services?: Yes  Family / Caregiver Present: No  General Comment  Comments: RN ok'd for therapy this AM. Pt agreeable to participate in session, pt mostly cooperative throughout however makes inappropriate comments and closes eyes intermittently throughout requiring redirection.   Patient Currently in Pain: Yes  Pain Assessment  Pain Assessment: 0-10  Pain Level: 3  Pain Type: Acute pain  Pain Location: Wrist; Hand  Pain Orientation: Right  Pain Radiating Towards: R shoulder  Pain Descriptors: Aching; Discomfort  Functional Pain Assessment: Prevents or interferes with all active and some passive activities  Non-Pharmaceutical Pain Intervention(s): Distraction; Elevation; Cold applied  Response to Pain Intervention: Patient Satisfied    Social/Functional History  Social/Functional History  Lives With: Spouse  Type of Home: House  Home Layout: One level  Home Access: Level entry  Bathroom Shower/Tub: Walk-in shower  Bathroom Toilet: Standard  Receives Help From: Family  ADL Assistance: Independent  Homemaking Assistance: Independent  Ambulation Assistance: Independent  Transfer Assistance: Independent  Active : Yes  Occupation: Full time employment  Type of occupation:        Objective   Vision: Impaired  Vision Exceptions: Wears glasses for distance  Hearing: Within functional limits    Orientation  Overall Orientation Status: Within Functional Limits        Balance  Sitting Balance: Independent  Standing Balance: Supervision    Functional Mobility  Functional - Mobility Device: No device  Activity: To/from bathroom  Assist Level: Supervision     ADL  Feeding: Increased time to complete; Setup  Grooming: Increased time to complete; Setup  UE Bathing: Increased time to complete; Verbal cueing; Minimal assistance  LE Bathing: Increased time to complete; Verbal cueing; Maximum assistance  UE Dressing: Increased time to complete; Verbal cueing; Minimal assistance  LE Dressing: Increased time to complete; Verbal cueing; Maximum assistance (seated EOB to don socks)  Toileting: Supervision; Increased time to complete (no LB clothing mngt required, pt standing at toilet to void)  Additional Comments: Pt limited in ADL performance due to BUE deficits including decreased [facility], decreased ROM/strength, and due to BUE edema. Pt additionally impulsive with poor safety awareness and decreased attention to task. Bed mobility  Supine to Sit: Supervision  Sit to Supine: Supervision  Scooting: Supervision  Comment: Pt performed 4x supine<>sit transfers during session as pt fluctuating between wanting to sit EOB and wanting to lay back into bed. VCs for safety as pt with quick impulsive movements and poor safety awareness. Transfers  Sit to stand: Supervision  Stand to sit: Supervision  Transfer Comments: Min VCs for safety awareness as pt impulsive with quick movements. Cognition  Overall Cognitive Status: Exceptions  Arousal/Alertness: Delayed responses to stimuli  Following Commands: Follows one step commands with increased time; Follows one step commands with repetition  Attention Span: Attends with cues to redirect;  Difficulty attending to directions  Memory: Decreased recall of precautions; Decreased recall of recent events  Safety Judgement: Decreased awareness of need for safety; Decreased awareness of need for assistance  Problem Solving: Decreased awareness of errors  Insights: Decreased awareness of deficits        Sensation  Overall Sensation Status: WFL (Pt denies any numbness/tingling)  Type of ROM/Therapeutic Exercise  Elbow flexion/extension bilaterally x10  Forearm pronation/supination bilaterally x10  Wrist flexion/extension bilaterally x10  Wrist radial/ulnar deviation bilaterally x10  Hand grasp/release bilaterally x10  Type of ROM/Therapeutic Exercise: AROM  Comment: Pt seated EOB and provided verbal instruction and demonstration regarding BUE AROM. Pt with fair return of education. LUE AROM (degrees)  LUE AROM : Exceptions  LUE General AROM: WFL at shoulder/elbow/forearm  L Wrist Flexion 0-80: ~0-60*  L Wrist Extension 0-70: ~0-50*  L Wrist Radial Deviation 0-20: ~0-10*  L Wrist Ulnar Deviation 0-45: ~0-20*  RUE AROM (degrees)  RUE AROM : Exceptions  RUE General AROM: WFL at shoulder/elbow  R Elbow Flexion 0-145: ~0-100*  R Elbow Extension 145-0: ~100*-15*  R Forearm Pron 0-90: ~0-30*  R Forearm Supination  0-90: ~0-30*  R Wrist Flexion 0-80: ~0-30*  R Wrist Extension 0-70: ~0-20*  R Wrist Radial Deviation 0-20: ~0-5*  R Wrist Ulnar Deviation 0-45: ~0-15*  Right Hand AROM (degrees)  Right Hand AROM: Exceptions (Pt able to wiggle digits 2-5)   Pt with noted BUE edema specifically B wrists/hand and more significant to RUE. Edema is a limiting factor to AROM to BUE and RUE is additionally limited due to pain. Pt educated in edema mngt techniques and verbalized understanding. At end of session pt retired with RUE elevated and ice pack in place.          Plan   Plan  Times per week: 3-4x/wk  Times per day: Daily  Current Treatment Recommendations: ROM, Balance Training, Functional Mobility Training, Endurance Training, Safety Education & Training, Patient/Caregiver Education & Training, Equipment Evaluation, Education, & procurement, Self-Care / ADL, Home Management Training    AM-PAC Score   AM-PAC Inpatient Daily Activity Raw Score: 15 (12/16/21 1325)  AM-PAC Inpatient ADL T-Scale Score : 34.69 (12/16/21 1325)  ADL Inpatient CMS 0-100% Score: 56.46 (12/16/21 1325)  ADL Inpatient CMS G-Code Modifier : CK (12/16/21 1325)    Goals  Short term goals  Time Frame for Short term goals: 14 visits  Short term goal 1: Pt will perform ADL tasks independently  Short term goal 2: Pt will independently demo HEP for BUE AROM  Short term goal 3: Pt will independently verbalize 2 techniques for edema mngt to BUE       Therapy Time   Individual Concurrent Group Co-treatment   Time In 1025         Time Out 1049         Minutes 24         Timed Code Treatment Minutes: 10 Minutes       RAVIN Deluna/L

## 2021-12-16 NOTE — CARE COORDINATION
Spoke with patients wife Lul Mcfarland on phone. She is currently at the airport and flight to [place] is at 3pm.  She is aware that pt has been discharged and is planning to pick him up this evening and will be bringing pt clothes.

## 2021-12-16 NOTE — PROGRESS NOTES
Pt refuses to wear telemetry, RN educated patient on importance and need to monitor heart since patient has had afib rvr, recent Cardizem and amiodarone drip. Pt also took IV out and refuses a restart at this time. RN spoke with marleni Aguilar at length about pts altered mental status, ct and mri scans, and echo. Wife Jeff is unaware of what medications the patient takes and states he does not have a cardiologist and has no insurance. Wife and patient counseled on need for a doctor to follow his a fib and heart history. RN called the pharmacy to confirm home meds and states that the patient has been noncompliant with medications and does not fill and refill things regularly.  Marleni Aguilar will fly into [place] and pick the patient up today for discharge

## 2021-12-16 NOTE — DISCHARGE SUMMARY
[facility]  Office: [address], DO, Augustine Succasunna, DO, Nilson Flores, DO, Dorota Montesinos [facility], DO, Easton Pickard MD, Deyanira Barboza MD, Haris Murray MD, Susan Moulton MD, Tamika Jay MD, Gerald Luna MD, Brian Dailey MD, Antonino Wilhelm, DO, Melissa Ruelas, DO, Cheryl Montesinos MD,  Elicia Friday, DO, Aparna Grant MD, Aimee Pearce MD, Skye Watson MD, Kimberley Nelson MD, Britney Santos MD, Narayan Em MD, Adeline Goldmann, MD, Tara Trevino, [facility], [facility], [facility], Maddy Nurse, CNP, Clemente Bentley, CNS, King Nelson, CNP, Loren Worley, CNP, Mark Little, CNP, Jennifer Tiwari, CNP, Tosin Contreras, CNP, Sebastian Valencia PA-C, Rosemarie Aguilar, [facility], Tomer Gardnerelor, CNP, Rigo Camera, CNP, Mary Eunice, CNP, Fatimah Squibb, CNP, Sailaja Beverly, CNP, Darrel Welch, [facility], [facility]    Discharge Summary     Patient ID: Yemi De La Cruz  :  1965   MRN: [id number]     ACCOUNT:  [de-identified]   Patient's PCP: No primary care provider on file. Admit Date: 2021   Discharge Date: 2021   Length of Stay: 3  Code Status:  Full Code  Admitting Physician: Kimberley Nelson MD  Discharge Physician: Kimberley Nelson MD     Active Discharge Diagnoses:     Hospital Problem Lists:  Active Problems:    GI bleed    Hypertension    Rheumatoid arthritis ([facility]    Shortness of breath    Atrial fibrillation ([facility]    Morbid obesity ([facility]    Tinea cruris    HFrEF (heart failure with reduced ejection fraction) ([facility]  Resolved Problems:    Hyperlipidemia      Admission Condition:  fair     Discharged Condition: fair    Hospital Stay:     Hospital Course:  Vanita Bethea is a 49 y.o.  Non- / non  male with a past medical history of atrial fibrillation (on Xarelto), rheumatoid arthritis (on chronic prednisone and NSAIDs) and morbid obesity who presented to the emergency department complaining of shortness of breath and dark, tarry bowel movements. The patient states that his symptoms began several days ago and have progressively worsened. He is a  who receives most of his medical care in [place]. In the ED, the patient was mildly hypotensive and ill-appearing. He was found to have an acute blood loss anemia with a hemoglobin of 7.7. Fecal occult blood was positive. He was admitted to internal medicine for further management of acute blood loss anemia secondary to GI bleed. The patient was evaluated by gastroenterology and had an endoscopy done during admission. The scope was remarkable for multiple clean-based ulcers in the antrum of the stomach with no evidence of active bleeding. The patient's hemoglobin remained stable during the course of admission and was restarted on Xarelto 24-hours after the endoscopy per GI recommendation. The patient exhibited intermittent confusion and disorientation throughout the course of admission (his wife states that this is not new and he has been like this for months). MRI of the brain was negative for an acute intracranial abnormality. The patient is discharged today (12/16) in stable condition. I have spoken with his wife who states that she will fly to [facility] today from [place] and bring him back home. I have advised that the patient will require close follow-up with the primary care physician of his choice. This physician also recommended that the patient stop driving at this time as he likely has underlying dementia and is high risk for being involved in an accident. The patient has been prescribed Lasix, spironolactone, amiodarone and diltiazem (these were sent to his pharmacy in [place]). The patient is instructed to follow-up with the primary care physician of his choice.        Significant therapeutic interventions: Endoscopy; MRI brain; echocardiogram; rate control     Significant Diagnostic Studies:   Labs / Micro:  BMP:    Lab Results   Component Value Date    GLUCOSE 155 12/16/2021     12/16/2021    K 3.8 12/16/2021     12/16/2021    CO2 21 12/16/2021    ANIONGAP 13 12/16/2021    BUN 16 12/16/2021    CREATININE 0.92 12/16/2021    BUNCRER NOT REPORTED 12/16/2021    CALCIUM 8.7 12/16/2021    LABGLOM >60 12/16/2021    GFRAA >60 12/16/2021    GFR      12/16/2021    GFR NOT REPORTED 12/16/2021        Radiology:  XR WRIST RIGHT (MIN 3 VIEWS)    Result Date: 12/13/2021  Possible nondisplaced fracture(s) involving the proximal phalanx of the 3rd digit as described. Chronic findings as described. Recommend dedicated hand radiograph for further assessment. XR HAND RIGHT (MIN 3 VIEWS)    Result Date: 12/14/2021  Irregularity of the radial base of the 3rd finger proximal phalanx appears to be corticated suggesting sequelae of remote prior injury. Remote prior 4th and 5th metacarpal fractures. Diffuse soft tissue swelling along the dorsum of the hand and wrist. Correlate for any concern for infection. If clinical concern persists for radiographically occult fracture, CT can be considered. Persistent flexion at the small finger proximal interphalangeal joint with overlying soft tissue swelling and mild periarticular calcifications. Sequelae of age-indeterminate trauma is possible, as is sequelae of underlying erosive or inflammatory arthropathy with a boutonniere deformity. Correlate clinically. CT HEAD WO CONTRAST    Result Date: 12/13/2021  No acute intracranial abnormality. CT ABDOMEN PELVIS W IV CONTRAST Additional Contrast? None    Result Date: 12/13/2021  1. No acute inflammatory process identified. 2.  Diverticulosis. RECOMMENDATIONS: Unavailable     XR CHEST PORTABLE    Result Date: 12/13/2021  Prior study available. Borderline cardiomegaly with venous hypertension but no obvious radiographic CHF. Hazy opacities mid-lower zones, probably combination superimposed soft tissue and some atelectasis; subtle ground-glass opacity difficult to exclude.      CT CHEST PULMONARY EMBOLISM W CONTRAST    Result Date: 12/13/2021  No evidence of pulmonary embolism. No acute pulmonary findings. Left atrial enlargement and three-vessel coronary artery disease. US DUP LOWER EXTREMITY LEFT GERRI    Result Date: 12/13/2021  No evidence of DVT in either visualized lower extremity. US DUP LOWER EXTREMITY RIGHT GERRI    Result Date: 12/13/2021  No evidence of DVT in either visualized lower extremity. MRI BRAIN W WO CONTRAST    Result Date: 12/15/2021  No acute intracranial abnormality. Mild chronic microvascular disease within the periventricular white matter. RECOMMENDATIONS: Unavailable       Consultations:    Consults:     Final Specialist Recommendations/Findings:   IP CONSULT TO GI  IP CONSULT TO SOCIAL WORK  IP CONSULT TO ORTHOPEDIC SURGERY  IP CONSULT TO CARDIOLOGY      The patient was seen and examined on day of discharge and this discharge summary is in conjunction with any daily progress note from day of discharge. Discharge plan:     Disposition: Home    Physician Follow Up:     No follow-up provider specified. Requiring Further Evaluation/Follow Up POST HOSPITALIZATION/Incidental Findings: Patient needs to evaluated for continued episodes of confusion (likely dementia). The patient appears to have a persistent leukocytosis that needs to be followed by his primary care physician (likely steroid induced). Diet: cardiac diet    Activity: As tolerated    Instructions to Patient: Take amiodarone, diltiazem, furosemide, spironolactone, metoprolol and pantoprazole as prescribed. Follow-up with the primary care physician of your choice. It is recommended that you stop driving.      Discharge Medications:      Medication List      START taking these medications    amiodarone 200 MG tablet  Commonly known as: CORDARONE  Take 1 tablet by mouth 2 times daily     dilTIAZem 120 MG extended release capsule  Commonly known as: CARDIZEM CD  Take 1 capsule by mouth daily  Start taking on: December 17, 2021     furosemide 40 MG tablet  Commonly known as: Lasix  Take 1 tablet by mouth daily     metoprolol tartrate 25 MG tablet  Commonly known as: LOPRESSOR  Take 1 tablet by mouth 2 times daily     pantoprazole 40 MG tablet  Commonly known as: PROTONIX  Take 1 tablet by mouth every morning (before breakfast)     spironolactone 25 MG tablet  Commonly known as: ALDACTONE  Take 1 tablet by mouth daily        CONTINUE taking these medications    Xarelto 20 MG Tabs tablet  Generic drug: rivaroxaban        STOP taking these medications    amLODIPine 5 MG tablet  Commonly known as: NORVASC     gabapentin 100 MG capsule  Commonly known as: NEURONTIN     ibuprofen 200 MG tablet  Commonly known as: ADVIL;MOTRIN     IMODIUM ADVANCED PO     meloxicam 7.5 MG tablet  Commonly known as: MOBIC     methotrexate 2.5 MG chemo tablet  Commonly known as: RHEUMATREX     metoprolol succinate 50 MG extended release tablet  Commonly known as: TOPROL XL           Where to Get Your Medications      These medications were sent to [address] [phone]  [address] [place] [id number]    Phone: [phone]   amiodarone 200 MG tablet  dilTIAZem 120 MG extended release capsule  furosemide 40 MG tablet  metoprolol tartrate 25 MG tablet  pantoprazole 40 MG tablet  spironolactone 25 MG tablet         No discharge procedures on file. Time Spent on discharge is  20 mins in patient examination, evaluation, counseling as well as medication reconciliation, prescriptions for required medications, discharge plan and follow up. Electronically signed by   Jl Li MD  12/16/2021  12:33 PM      Thank you Dr. Jamir Kelsey primary care provider on file. for the opportunity to be involved in this patient's care.

## 2021-12-16 NOTE — PROGRESS NOTES
Patient refused midnight vitals. Writer discussed with patient the importance of us taking his blood pressure but he stated that \"he did not care, we were not going to continue to cut his arm off\". Writer explained that we could try to take a blood pressure on his leg to help steer clear of where his pain was and he still refused. Will continue to monitor.  .Electronically signed by Lisseth Viera RN on 12/16/2021 at 5:22 AM

## 2021-12-16 NOTE — PROGRESS NOTES
Patient unable to get morning labs due to not allowing lab to draw off right arm (due to pain) and lab unable to draw from left arm due to lack of access from swelling. Dr Racquel Orellana has been notified.  .Electronically signed by Rashmi Martinez RN on 12/16/2021 at 7:14 AM

## 2021-12-16 NOTE — PROGRESS NOTES
[facility]  Progress Note                   Date:   12/16/2021  Patient name: Anthony De La Cruz  Date of admission:  12/13/2021  5:51 AM  MRN:   [id number]  YOB: 1965  PCP: No primary care provider on file. Reason for Admission: GI bleed [K92.2]  Anemia, blood loss [D50.0]  Atrial fibrillation with RVR (HCC) [I48.91]  Gastrointestinal hemorrhage with melena [K92.1]  Acute pain of right wrist [M25.531]  Altered mental status, unspecified altered mental status type [R41.82]    Subjective:       Clinical Changes /Abnormalities:  Patient seen and examined in room after discussion with RN. Denies chest pain. SOB noted with conversation. Generalized edema noted especially in hands and feet. Labs ordered/pending d/t not able to obtain this am.  A Fib on tele     Review of Systems    Medications:   Scheduled Meds:   dilTIAZem  120 mg Oral Daily    rivaroxaban  20 mg Oral Daily with breakfast    amiodarone  200 mg Oral BID    metoprolol tartrate  25 mg Oral BID    pantoprazole  40 mg IntraVENous Daily    And    sodium chloride (PF)  10 mL IntraVENous Daily    sodium chloride flush  5-40 mL IntraVENous 2 times per day    ketoconazole   Topical Daily     Continuous Infusions:   sodium chloride      sodium chloride      sodium chloride       CBC:   Recent Labs     12/14/21 0414 12/14/21  1031 12/14/21  1718 12/14/21  2234 12/15/21  0438   WBC 17.3*  --   --   --  19.7*   HGB 7.6*   < > 7.7* 8.3* 7.3*   *  --   --   --  497*    < > = values in this interval not displayed. BMP:    Recent Labs     12/14/21  0414 12/15/21  0438    133*   K 4.0 3.9    100   CO2 22 23   BUN 35* 23*   CREATININE 0.83 0.89   GLUCOSE 101* 124*     Hepatic:  Recent Labs     12/14/21 0414 12/15/21  0438   AST 9 7   ALT 10 9   BILITOT 0.56 0.35   ALKPHOS 65 70     Troponin: No results for input(s): TROPHS in the last 72 hours.   BNP: No results for input(s): BNP in the last 72 hours. Lipids: No results for input(s): CHOL, HDL in the last 72 hours. Invalid input(s): LDLCALCU  INR: No results for input(s): INR in the last 72 hours.       DATA:    Diagnostics:    EKG:   atrial fibrillation, rate 111, unchanged from previous tracings. ECHO: 12/13/21   Summary  Technically difficult study. Suggest Definity contrast due to poor  endocardial definition. Left ventricle is normal in size and moderate left ventricular hypertrophy. Global left ventricular systolic function is low normal Estimated ejection  fraction is 45-50 %. Unable to exclude wall motion abnormalities due to poor endocardial  definition and increased heart rate. Right atrial dilatation. Right ventricular dilatation with normal systolic function. Trivial to mild mitral regurgitation. Trivial pulmonic insufficiency. Aortic root is mildly dilated. Stress Test: not obtained. Cardiac Angiography: not obtained. Objective:   Vitals: /86   Pulse 113   Temp 98.8 °F (37.1 °C) (Oral)   Resp 18   Ht 6' 5\" (1.956 m)   Wt (!) 330 lb (149.7 kg)   SpO2 92%   BMI 39.13 kg/m²   General appearance: alert and cooperative with exam  HEENT: Head: Normocephalic, no lesions, without obvious abnormality. Neck:no JVD, trachea midline, no adenopathy  Lungs: Clear to auscultation  Heart: Irregular rate and rhythm, s1/s2 auscultated, no murmurs A Fib on tele   Abdomen: soft, non-tender, bowel sounds active  Extremities: +2 edema  Generalized especially in feet and hands. Neurologic: not done        Assessment / Acute Cardiac Problems:   1. A Fib with RVR  2. HTN  3. Anemia GI bleed GI consulted EGD        Patient Active Problem List:     GI bleed     Hypertension     Rheumatoid arthritis ([address]     Shortness of breath     Atrial fibrillation (HCC)     Morbid obesity (HCC)     Tinea cruris     HFrEF (heart failure with reduced ejection fraction) ([address]      Plan of Treatment:   1. A fib with RVR   on tele.   PO amiodarone, cardizem, BB, and Xarelto. IV Dig X 2 today. 2. HTN Controlled on BB and CCB. 3. LE edema and generalized. Will order IV Lasix 40 mg.  Creatinine yesterday 0.89  4. Keep K > 4.0 and Mag > 2.0  K 3.9 yesterday. 5. Recommend HGB > 8  6.  Awaiting Labs  Unable to obtain this am.  Stable yesterday    Electronically signed by MARIFER Perez NP on 12/16/2021 at [address] [place] [address].  [phone]

## 2021-12-16 NOTE — PROGRESS NOTES
Patient continuously pulling telemetry leads off and writer reapplied leads on multiple occurrences but last occurrence patient refused. Writer educated patient on importance of leaving telemetry monitor connected and patient still refused. Patient also had inappropriate behavior witnessed by staff multiple times throughout the night including coming out to nurses station without patient gown on. Writer notified np on call with the above situations.      .Electronically signed by Rashmi Martinez RN on 12/16/2021 at 5:53 AM

## 2021-12-17 LAB
DIGOXIN DATE LAST DOSE: ABNORMAL
DIGOXIN DOSE AMOUNT: ABNORMAL
DIGOXIN DOSE TIME: ABNORMAL
DIGOXIN LEVEL: 0.4 NG/ML (ref 0.5–2)

## 2021-12-17 PROCEDURE — 6370000000 HC RX 637 (ALT 250 FOR IP): Performed by: INTERNAL MEDICINE

## 2021-12-17 RX ADMIN — ACETAMINOPHEN 650 MG: 325 TABLET ORAL at 00:12

## 2021-12-17 ASSESSMENT — PAIN SCALES - GENERAL: PAINLEVEL_OUTOF10: 10

## 2021-12-17 NOTE — PROGRESS NOTES
Pt discharged via wheelchair with all belongings and discharge paperwork by 2 PCTs. Discharge instructions reviewed with pt and care giver. Encouraged to see physical therapy. Educated on importance of moving his hand often and lifting up to keep the swelling down and lessen pain. Tylenol given instead of percocet to keep him more alert. Agreeable by his wife. Writer suggested wife to stay until the ride arrive at the hospital. Wife refused. All questions answered. DC instructions

## 2021-12-17 NOTE — DISCHARGE INSTR - COC
Continuity of Care Form    Patient Name: Declan De La Cruz   :  1965  MRN:  [id number]    Admit date:  2021  Discharge date:  ***    Code Status Order: Full Code   Advance Directives:   Advance Care Flowsheet Documentation       Date/Time Healthcare Directive Type of Healthcare Directive Copy in [address] Agent's Name Healthcare Agent's Phone Number    21 0701 No, patient does not have an advance directive for healthcare treatment -- -- -- -- --            Admitting Physician:  Nuvia Nguyễn MD  PCP: No primary care provider on file. Discharging Nurse: [facility] Unit/Room#: 170/299-17  Discharging Unit Phone Number: ***    Emergency Contact:   Extended Emergency Contact Information  Primary Emergency Contact: [address] [facility]  Mobile Phone: [phone]  Relation: Other   needed? No    Past Surgical History:  Past Surgical History:   Procedure Laterality Date    ACHILLES TENDON SURGERY      KNEE SURGERY      UPPER GASTROINTESTINAL ENDOSCOPY  2021    UPPER GASTROINTESTINAL ENDOSCOPY N/A 2021    EGD BIOPSY performed by Lion Medel MD at [address] [place] [address].       Immunization History: There is no immunization history on file for this patient.     Active Problems:  Patient Active Problem List   Diagnosis Code    GI bleed K92.2    Hypertension I10    Rheumatoid arthritis ([facility] M06.9    Shortness of breath R06.02    Atrial fibrillation (HCC) I48.91    Morbid obesity ([facility]) E66.01    Tinea cruris B35.6    HFrEF (heart failure with reduced ejection fraction) ([facility] I50.20       Isolation/Infection:   Isolation            No Isolation          Patient Infection Status       Infection Onset Added Last Indicated Last Indicated By Review Planned Expiration Resolved Resolved By    None active    Resolved    COVID-19 (Rule Out) 21 COVID-19, Rapid (Ordered)   21 Rule-Out Test Resulted            Nurse Assessment:  Last Vital Signs: BP (!) 143/78   Pulse 116   Temp 99.5 °F (37.5 °C)   Resp 20   Ht 6' 5\" (1.956 m)   Wt (!) 330 lb (149.7 kg)   SpO2 95%   BMI 39.13 kg/m²     Last documented pain score (0-10 scale): Pain Level: 10  Last Weight:   Wt Readings from Last 1 Encounters:   21 (!) 330 lb (149.7 kg)     Mental Status:  {IP PT MENTAL STATUS:}    IV Access:  { RE IV ACCESS:342730603}    Nursing Mobility/ADLs:  Walking   {CHP DME BPIU:970799735}  Transfer  {CHP DME NZRA:587219596}  Bathing  {CHP DME KHUW:126913857}  Dressing  {CHP DME RDDV:969924953}  Toileting  {CHP DME IIQC:311804119}  Feeding  {CHP DME HTDD:712744379}  Med Admin  {P DME XVWW:546347027}  Med Delivery   { RE MED Delivery:121220602}    Wound Care Documentation and Therapy:        Elimination:  Continence: Bowel: {YES / XF:10366}  Bladder: {YES / NR:23093}  Urinary Catheter: {Urinary Catheter:208568065}   Colostomy/Ileostomy/Ileal Conduit: {YES / HQ:59071}       Date of Last BM: ***    Intake/Output Summary (Last 24 hours) at 2021 0001  Last data filed at 2021 1200  Gross per 24 hour   Intake 490 ml   Output 350 ml   Net 140 ml     I/O last 3 completed shifts:   In: 56 [P.O.:490]  Out: 350 [Urine:350]    Safety Concerns:     [address] [employer] Safety Concerns:469635280}    Impairments/Disabilities:      [address] [employer] Impairments/Disabilities:160876214}    Nutrition Therapy:  Current Nutrition Therapy:   [address] [employer] Diet List:935020165}    Routes of Feeding: {CHP DME Other Feedings:715490879}  Liquids: {Slp liquid thickness:36918}  Daily Fluid Restriction: {CHP DME Yes amt example:002412303}  Last Modified Barium Swallow with Video (Video Swallowing Test): {Done Not Done YGJ}    Treatments at the Time of Hospital Discharge:   Respiratory Treatments: ***  Oxygen Therapy:  {Therapy; copd oxygen:58732}  Ventilator:    {MH CC Vent KUQ:653556272}    Rehab Therapies: {THERAPEUTIC INTERVENTION:3363164474}  Weight Bearing

## 2021-12-18 LAB
CULTURE: NORMAL
CULTURE: NORMAL
Lab: NORMAL
Lab: NORMAL
SPECIMEN DESCRIPTION: NORMAL
SPECIMEN DESCRIPTION: NORMAL

## 2022-01-05 LAB
EKG ATRIAL RATE: 416 BPM
EKG Q-T INTERVAL: 300 MS
EKG QRS DURATION: 94 MS
EKG QTC CALCULATION (BAZETT): 444 MS
EKG R AXIS: 59 DEGREES
EKG T AXIS: 15 DEGREES
EKG VENTRICULAR RATE: 132 BPM

## (undated) DEVICE — BASIN EMSIS 700ML GRAPHITE PLAS KID SHP GRAD

## (undated) DEVICE — GAUZE,SPONGE,3"X3",4PLY,NS,LF: Brand: MEDLINE

## (undated) DEVICE — Device: Brand: DEFENDO VALVE AND CONNECTOR KIT

## (undated) DEVICE — JELLY,LUBE,STERILE,FLIP TOP,TUBE,2-OZ: Brand: MEDLINE

## (undated) DEVICE — POLYP TRAP: Brand: TRAPEASE®

## (undated) DEVICE — CONNECTOR TBNG AUX H2O JET DISP FOR OLY 160/180 SER

## (undated) DEVICE — 4-PORT MANIFOLD: Brand: NEPTUNE 2

## (undated) DEVICE — SOLUTION IV IRRIG WATER 1000ML POUR BRL 2F7114

## (undated) DEVICE — FORCEPS BX L240CM JAW DIA2.8MM L CAP W/ NDL MIC MESH TOOTH

## (undated) DEVICE — ADAPTER,CATHETER/SYRINGE/LUER,STERILE: Brand: MEDLINE

## (undated) DEVICE — CANNULA IV 18GA L15IN BLNT FILL LUERLOCK HUB MJCT

## (undated) DEVICE — TUBING INSUFFLATION CAP W/ EXT CARBON DIOX ENDO SMARTCAP

## (undated) DEVICE — SYRINGE MED 50ML LUERLOCK TIP

## (undated) DEVICE — BITEBLOCK 54FR W/ DENT RIM BLOX

## (undated) DEVICE — TUBING, SUCTION, 3/16" X 10', STRAIGHT: Brand: MEDLINE

## (undated) DEVICE — Z DISCONTINUED USE 2751540 TUBING IRRIG L10IN DISP PMP ENDOGATOR